# Patient Record
Sex: FEMALE | Race: WHITE | Employment: OTHER | URBAN - METROPOLITAN AREA
[De-identification: names, ages, dates, MRNs, and addresses within clinical notes are randomized per-mention and may not be internally consistent; named-entity substitution may affect disease eponyms.]

---

## 2023-02-06 ENCOUNTER — APPOINTMENT (OUTPATIENT)
Dept: GENERAL RADIOLOGY | Age: 73
DRG: 689 | End: 2023-02-06
Payer: MEDICARE

## 2023-02-06 ENCOUNTER — HOSPITAL ENCOUNTER (INPATIENT)
Age: 73
LOS: 1 days | Discharge: HOME | DRG: 689 | End: 2023-02-09
Attending: EMERGENCY MEDICINE | Admitting: INTERNAL MEDICINE
Payer: MEDICARE

## 2023-02-06 ENCOUNTER — APPOINTMENT (OUTPATIENT)
Dept: CT IMAGING | Age: 73
DRG: 689 | End: 2023-02-06
Payer: MEDICARE

## 2023-02-06 DIAGNOSIS — R62.7 FAILURE TO THRIVE IN ADULT: ICD-10-CM

## 2023-02-06 DIAGNOSIS — R53.1 GENERALIZED WEAKNESS: Primary | ICD-10-CM

## 2023-02-06 DIAGNOSIS — N39.0 ACUTE UTI: ICD-10-CM

## 2023-02-06 LAB
ALBUMIN SERPL-MCNC: 2.8 G/DL (ref 3.2–4.6)
ALBUMIN/GLOB SERPL: 0.6 (ref 0.4–1.6)
ALP SERPL-CCNC: 76 U/L (ref 50–136)
ALT SERPL-CCNC: 20 U/L (ref 12–65)
ANION GAP SERPL CALC-SCNC: 8 MMOL/L (ref 2–11)
APPEARANCE UR: ABNORMAL
AST SERPL-CCNC: 17 U/L (ref 15–37)
BACTERIA URNS QL MICRO: ABNORMAL /HPF
BASOPHILS # BLD: 0.1 K/UL (ref 0–0.2)
BASOPHILS NFR BLD: 1 % (ref 0–2)
BILIRUB SERPL-MCNC: 0.7 MG/DL (ref 0.2–1.1)
BILIRUB UR QL: NEGATIVE
BUN SERPL-MCNC: 13 MG/DL (ref 8–23)
CALCIUM SERPL-MCNC: 8.8 MG/DL (ref 8.3–10.4)
CHLORIDE SERPL-SCNC: 106 MMOL/L (ref 101–110)
CO2 SERPL-SCNC: 21 MMOL/L (ref 21–32)
COLOR UR: ABNORMAL
CREAT SERPL-MCNC: 0.8 MG/DL (ref 0.6–1)
DIFFERENTIAL METHOD BLD: ABNORMAL
EKG ATRIAL RATE: 38 BPM
EKG ATRIAL RATE: 96 BPM
EKG DIAGNOSIS: NORMAL
EKG DIAGNOSIS: NORMAL
EKG P AXIS: 58 DEGREES
EKG P AXIS: 58 DEGREES
EKG P-R INTERVAL: 132 MS
EKG P-R INTERVAL: 136 MS
EKG Q-T INTERVAL: 394 MS
EKG Q-T INTERVAL: 414 MS
EKG QRS DURATION: 132 MS
EKG QRS DURATION: 136 MS
EKG QTC CALCULATION (BAZETT): 393 MS
EKG QTC CALCULATION (BAZETT): 497 MS
EKG R AXIS: 4 DEGREES
EKG R AXIS: 4 DEGREES
EKG T AXIS: -28 DEGREES
EKG T AXIS: -55 DEGREES
EKG VENTRICULAR RATE: 87 BPM
EKG VENTRICULAR RATE: 96 BPM
EOSINOPHIL # BLD: 0.7 K/UL (ref 0–0.8)
EOSINOPHIL NFR BLD: 6 % (ref 0.5–7.8)
EPI CELLS #/AREA URNS HPF: ABNORMAL /HPF
ERYTHROCYTE [DISTWIDTH] IN BLOOD BY AUTOMATED COUNT: 14.7 % (ref 11.9–14.6)
GLOBULIN SER CALC-MCNC: 4.4 G/DL (ref 2.8–4.5)
GLUCOSE BLD STRIP.AUTO-MCNC: 118 MG/DL (ref 65–100)
GLUCOSE BLD STRIP.AUTO-MCNC: 141 MG/DL (ref 65–100)
GLUCOSE SERPL-MCNC: 98 MG/DL (ref 65–100)
GLUCOSE UR STRIP.AUTO-MCNC: NEGATIVE MG/DL
HCT VFR BLD AUTO: 47.8 % (ref 35.8–46.3)
HGB BLD-MCNC: 14.8 G/DL (ref 11.7–15.4)
HGB UR QL STRIP: ABNORMAL
IMM GRANULOCYTES # BLD AUTO: 0.2 K/UL (ref 0–0.5)
IMM GRANULOCYTES NFR BLD AUTO: 1 % (ref 0–5)
KETONES UR QL STRIP.AUTO: NEGATIVE MG/DL
LACTATE SERPL-SCNC: 1.9 MMOL/L (ref 0.4–2)
LACTATE SERPL-SCNC: 2.6 MMOL/L (ref 0.4–2)
LEUKOCYTE ESTERASE UR QL STRIP.AUTO: ABNORMAL
LYMPHOCYTES # BLD: 1.8 K/UL (ref 0.5–4.6)
LYMPHOCYTES NFR BLD: 15 % (ref 13–44)
MAGNESIUM SERPL-MCNC: 2 MG/DL (ref 1.8–2.4)
MCH RBC QN AUTO: 28.7 PG (ref 26.1–32.9)
MCHC RBC AUTO-ENTMCNC: 31 G/DL (ref 31.4–35)
MCV RBC AUTO: 92.8 FL (ref 82–102)
MONOCYTES # BLD: 1.1 K/UL (ref 0.1–1.3)
MONOCYTES NFR BLD: 9 % (ref 4–12)
NEUTS SEG # BLD: 8.3 K/UL (ref 1.7–8.2)
NEUTS SEG NFR BLD: 68 % (ref 43–78)
NITRITE UR QL STRIP.AUTO: POSITIVE
NRBC # BLD: 0 K/UL (ref 0–0.2)
OTHER OBSERVATIONS: ABNORMAL
PH UR STRIP: 5.5 (ref 5–9)
PLATELET # BLD AUTO: 283 K/UL (ref 150–450)
PMV BLD AUTO: 9.7 FL (ref 9.4–12.3)
POTASSIUM SERPL-SCNC: 4.4 MMOL/L (ref 3.5–5.1)
PROCALCITONIN SERPL-MCNC: <0.05 NG/ML (ref 0–0.49)
PROT SERPL-MCNC: 7.2 G/DL (ref 6.3–8.2)
PROT UR STRIP-MCNC: ABNORMAL MG/DL
RBC # BLD AUTO: 5.15 M/UL (ref 4.05–5.2)
SERVICE CMNT-IMP: ABNORMAL
SERVICE CMNT-IMP: ABNORMAL
SODIUM SERPL-SCNC: 135 MMOL/L (ref 133–143)
SP GR UR REFRACTOMETRY: 1.01 (ref 1–1.02)
UROBILINOGEN UR QL STRIP.AUTO: 1 EU/DL (ref 0.2–1)
WBC # BLD AUTO: 12.1 K/UL (ref 4.3–11.1)
WBC URNS QL MICRO: ABNORMAL /HPF

## 2023-02-06 PROCEDURE — G0378 HOSPITAL OBSERVATION PER HR: HCPCS

## 2023-02-06 PROCEDURE — 87086 URINE CULTURE/COLONY COUNT: CPT

## 2023-02-06 PROCEDURE — 99285 EMERGENCY DEPT VISIT HI MDM: CPT

## 2023-02-06 PROCEDURE — 36415 COLL VENOUS BLD VENIPUNCTURE: CPT

## 2023-02-06 PROCEDURE — 70450 CT HEAD/BRAIN W/O DYE: CPT

## 2023-02-06 PROCEDURE — 6370000000 HC RX 637 (ALT 250 FOR IP): Performed by: INTERNAL MEDICINE

## 2023-02-06 PROCEDURE — 80053 COMPREHEN METABOLIC PANEL: CPT

## 2023-02-06 PROCEDURE — 96366 THER/PROPH/DIAG IV INF ADDON: CPT

## 2023-02-06 PROCEDURE — 85025 COMPLETE CBC W/AUTO DIFF WBC: CPT

## 2023-02-06 PROCEDURE — 96372 THER/PROPH/DIAG INJ SC/IM: CPT

## 2023-02-06 PROCEDURE — 83735 ASSAY OF MAGNESIUM: CPT

## 2023-02-06 PROCEDURE — 84145 PROCALCITONIN (PCT): CPT

## 2023-02-06 PROCEDURE — 82962 GLUCOSE BLOOD TEST: CPT

## 2023-02-06 PROCEDURE — 83605 ASSAY OF LACTIC ACID: CPT

## 2023-02-06 PROCEDURE — 81001 URINALYSIS AUTO W/SCOPE: CPT

## 2023-02-06 PROCEDURE — 6360000002 HC RX W HCPCS: Performed by: EMERGENCY MEDICINE

## 2023-02-06 PROCEDURE — 71045 X-RAY EXAM CHEST 1 VIEW: CPT

## 2023-02-06 PROCEDURE — 87040 BLOOD CULTURE FOR BACTERIA: CPT

## 2023-02-06 PROCEDURE — 96375 TX/PRO/DX INJ NEW DRUG ADDON: CPT

## 2023-02-06 PROCEDURE — 2700000000 HC OXYGEN THERAPY PER DAY

## 2023-02-06 PROCEDURE — 87088 URINE BACTERIA CULTURE: CPT

## 2023-02-06 PROCEDURE — 2580000003 HC RX 258: Performed by: EMERGENCY MEDICINE

## 2023-02-06 PROCEDURE — 93005 ELECTROCARDIOGRAM TRACING: CPT | Performed by: STUDENT IN AN ORGANIZED HEALTH CARE EDUCATION/TRAINING PROGRAM

## 2023-02-06 PROCEDURE — 94760 N-INVAS EAR/PLS OXIMETRY 1: CPT

## 2023-02-06 PROCEDURE — 2580000003 HC RX 258: Performed by: INTERNAL MEDICINE

## 2023-02-06 PROCEDURE — 96365 THER/PROPH/DIAG IV INF INIT: CPT

## 2023-02-06 PROCEDURE — 6360000002 HC RX W HCPCS: Performed by: INTERNAL MEDICINE

## 2023-02-06 PROCEDURE — 87186 SC STD MICRODIL/AGAR DIL: CPT

## 2023-02-06 PROCEDURE — 2500000003 HC RX 250 WO HCPCS: Performed by: INTERNAL MEDICINE

## 2023-02-06 PROCEDURE — 93005 ELECTROCARDIOGRAM TRACING: CPT | Performed by: EMERGENCY MEDICINE

## 2023-02-06 RX ORDER — NICOTINE 21 MG/24HR
1 PATCH, TRANSDERMAL 24 HOURS TRANSDERMAL DAILY
Status: DISCONTINUED | OUTPATIENT
Start: 2023-02-06 | End: 2023-02-09 | Stop reason: HOSPADM

## 2023-02-06 RX ORDER — ACETAMINOPHEN 650 MG/1
650 SUPPOSITORY RECTAL EVERY 6 HOURS PRN
Status: DISCONTINUED | OUTPATIENT
Start: 2023-02-06 | End: 2023-02-09 | Stop reason: HOSPADM

## 2023-02-06 RX ORDER — FAMOTIDINE 20 MG/1
10 TABLET, FILM COATED ORAL DAILY PRN
Status: DISCONTINUED | OUTPATIENT
Start: 2023-02-06 | End: 2023-02-09 | Stop reason: HOSPADM

## 2023-02-06 RX ORDER — ONDANSETRON 4 MG/1
4 TABLET, ORALLY DISINTEGRATING ORAL EVERY 8 HOURS PRN
Status: DISCONTINUED | OUTPATIENT
Start: 2023-02-06 | End: 2023-02-09 | Stop reason: HOSPADM

## 2023-02-06 RX ORDER — ONDANSETRON 2 MG/ML
4 INJECTION INTRAMUSCULAR; INTRAVENOUS EVERY 6 HOURS PRN
Status: DISCONTINUED | OUTPATIENT
Start: 2023-02-06 | End: 2023-02-09 | Stop reason: HOSPADM

## 2023-02-06 RX ORDER — MAGNESIUM HYDROXIDE/ALUMINUM HYDROXICE/SIMETHICONE 120; 1200; 1200 MG/30ML; MG/30ML; MG/30ML
30 SUSPENSION ORAL EVERY 6 HOURS PRN
Status: DISCONTINUED | OUTPATIENT
Start: 2023-02-06 | End: 2023-02-09 | Stop reason: HOSPADM

## 2023-02-06 RX ORDER — ASPIRIN 81 MG/1
81 TABLET ORAL DAILY
Status: DISCONTINUED | OUTPATIENT
Start: 2023-02-07 | End: 2023-02-09 | Stop reason: HOSPADM

## 2023-02-06 RX ORDER — ENOXAPARIN SODIUM 100 MG/ML
40 INJECTION SUBCUTANEOUS EVERY 24 HOURS
Status: DISCONTINUED | OUTPATIENT
Start: 2023-02-06 | End: 2023-02-09 | Stop reason: HOSPADM

## 2023-02-06 RX ORDER — MAGNESIUM SULFATE IN WATER 40 MG/ML
2000 INJECTION, SOLUTION INTRAVENOUS PRN
Status: DISCONTINUED | OUTPATIENT
Start: 2023-02-06 | End: 2023-02-09 | Stop reason: HOSPADM

## 2023-02-06 RX ORDER — 0.9 % SODIUM CHLORIDE 0.9 %
1000 INTRAVENOUS SOLUTION INTRAVENOUS ONCE
Status: COMPLETED | OUTPATIENT
Start: 2023-02-06 | End: 2023-02-06

## 2023-02-06 RX ORDER — SODIUM CHLORIDE 0.9 % (FLUSH) 0.9 %
5-40 SYRINGE (ML) INJECTION EVERY 12 HOURS SCHEDULED
Status: DISCONTINUED | OUTPATIENT
Start: 2023-02-06 | End: 2023-02-09 | Stop reason: HOSPADM

## 2023-02-06 RX ORDER — INSULIN LISPRO 100 [IU]/ML
0-4 INJECTION, SOLUTION INTRAVENOUS; SUBCUTANEOUS NIGHTLY
Status: DISCONTINUED | OUTPATIENT
Start: 2023-02-06 | End: 2023-02-09 | Stop reason: HOSPADM

## 2023-02-06 RX ORDER — POTASSIUM CHLORIDE 7.45 MG/ML
10 INJECTION INTRAVENOUS PRN
Status: DISCONTINUED | OUTPATIENT
Start: 2023-02-06 | End: 2023-02-09 | Stop reason: HOSPADM

## 2023-02-06 RX ORDER — ATORVASTATIN CALCIUM 80 MG/1
80 TABLET, FILM COATED ORAL NIGHTLY
Status: DISCONTINUED | OUTPATIENT
Start: 2023-02-06 | End: 2023-02-09 | Stop reason: HOSPADM

## 2023-02-06 RX ORDER — DULOXETIN HYDROCHLORIDE 30 MG/1
60 CAPSULE, DELAYED RELEASE ORAL DAILY
Status: DISCONTINUED | OUTPATIENT
Start: 2023-02-06 | End: 2023-02-09 | Stop reason: HOSPADM

## 2023-02-06 RX ORDER — TRAZODONE HYDROCHLORIDE 50 MG/1
100 TABLET ORAL NIGHTLY
Status: DISCONTINUED | OUTPATIENT
Start: 2023-02-06 | End: 2023-02-09 | Stop reason: HOSPADM

## 2023-02-06 RX ORDER — VITAMIN B COMPLEX
1000 TABLET ORAL DAILY
Status: DISCONTINUED | OUTPATIENT
Start: 2023-02-06 | End: 2023-02-09 | Stop reason: HOSPADM

## 2023-02-06 RX ORDER — ACETAMINOPHEN 325 MG/1
650 TABLET ORAL EVERY 6 HOURS PRN
Status: DISCONTINUED | OUTPATIENT
Start: 2023-02-06 | End: 2023-02-09 | Stop reason: HOSPADM

## 2023-02-06 RX ORDER — CLOPIDOGREL BISULFATE 75 MG/1
75 TABLET ORAL DAILY
Status: DISCONTINUED | OUTPATIENT
Start: 2023-02-06 | End: 2023-02-09 | Stop reason: HOSPADM

## 2023-02-06 RX ORDER — INSULIN LISPRO 100 [IU]/ML
0-8 INJECTION, SOLUTION INTRAVENOUS; SUBCUTANEOUS
Status: DISCONTINUED | OUTPATIENT
Start: 2023-02-06 | End: 2023-02-09 | Stop reason: HOSPADM

## 2023-02-06 RX ORDER — SODIUM CHLORIDE 0.9 % (FLUSH) 0.9 %
5-40 SYRINGE (ML) INJECTION PRN
Status: DISCONTINUED | OUTPATIENT
Start: 2023-02-06 | End: 2023-02-09 | Stop reason: HOSPADM

## 2023-02-06 RX ORDER — LISINOPRIL 5 MG/1
5 TABLET ORAL DAILY
Status: DISCONTINUED | OUTPATIENT
Start: 2023-02-06 | End: 2023-02-09 | Stop reason: HOSPADM

## 2023-02-06 RX ORDER — SODIUM CHLORIDE 9 MG/ML
INJECTION, SOLUTION INTRAVENOUS PRN
Status: DISCONTINUED | OUTPATIENT
Start: 2023-02-06 | End: 2023-02-09 | Stop reason: HOSPADM

## 2023-02-06 RX ORDER — BISACODYL 10 MG
10 SUPPOSITORY, RECTAL RECTAL DAILY PRN
Status: DISCONTINUED | OUTPATIENT
Start: 2023-02-06 | End: 2023-02-09 | Stop reason: HOSPADM

## 2023-02-06 RX ORDER — SODIUM CHLORIDE 9 MG/ML
INJECTION, SOLUTION INTRAVENOUS CONTINUOUS
Status: DISCONTINUED | OUTPATIENT
Start: 2023-02-06 | End: 2023-02-07

## 2023-02-06 RX ORDER — DEXTROSE MONOHYDRATE 100 MG/ML
INJECTION, SOLUTION INTRAVENOUS CONTINUOUS PRN
Status: DISCONTINUED | OUTPATIENT
Start: 2023-02-06 | End: 2023-02-09 | Stop reason: HOSPADM

## 2023-02-06 RX ORDER — POLYETHYLENE GLYCOL 3350 17 G/17G
17 POWDER, FOR SOLUTION ORAL DAILY PRN
Status: DISCONTINUED | OUTPATIENT
Start: 2023-02-06 | End: 2023-02-09 | Stop reason: HOSPADM

## 2023-02-06 RX ORDER — POTASSIUM CHLORIDE 20 MEQ/1
40 TABLET, EXTENDED RELEASE ORAL PRN
Status: DISCONTINUED | OUTPATIENT
Start: 2023-02-06 | End: 2023-02-09 | Stop reason: HOSPADM

## 2023-02-06 RX ADMIN — CLOPIDOGREL BISULFATE 75 MG: 75 TABLET ORAL at 15:20

## 2023-02-06 RX ADMIN — TUBERCULIN PURIFIED PROTEIN DERIVATIVE 5 UNITS: 5 INJECTION, SOLUTION INTRADERMAL at 15:44

## 2023-02-06 RX ADMIN — LISINOPRIL 5 MG: 5 TABLET ORAL at 15:46

## 2023-02-06 RX ADMIN — VITAMIN D, TAB 1000IU (100/BT) 1000 UNITS: 25 TAB at 15:46

## 2023-02-06 RX ADMIN — SODIUM CHLORIDE: 9 INJECTION, SOLUTION INTRAVENOUS at 15:14

## 2023-02-06 RX ADMIN — ATORVASTATIN CALCIUM 80 MG: 40 TABLET, FILM COATED ORAL at 21:25

## 2023-02-06 RX ADMIN — TRAZODONE HYDROCHLORIDE 100 MG: 50 TABLET ORAL at 21:25

## 2023-02-06 RX ADMIN — SODIUM CHLORIDE 1000 ML: 9 INJECTION, SOLUTION INTRAVENOUS at 11:49

## 2023-02-06 RX ADMIN — DULOXETINE HYDROCHLORIDE 60 MG: 30 CAPSULE, DELAYED RELEASE ORAL at 15:46

## 2023-02-06 RX ADMIN — ONDANSETRON 4 MG: 2 INJECTION INTRAMUSCULAR; INTRAVENOUS at 18:41

## 2023-02-06 RX ADMIN — ENOXAPARIN SODIUM 40 MG: 100 INJECTION SUBCUTANEOUS at 15:20

## 2023-02-06 RX ADMIN — METOPROLOL TARTRATE 25 MG: 25 TABLET, FILM COATED ORAL at 21:32

## 2023-02-06 RX ADMIN — CEFTRIAXONE 1000 MG: 1 INJECTION, POWDER, FOR SOLUTION INTRAMUSCULAR; INTRAVENOUS at 11:53

## 2023-02-06 ASSESSMENT — ENCOUNTER SYMPTOMS
RHINORRHEA: 0
VOMITING: 0
COUGH: 0
NAUSEA: 0
ABDOMINAL PAIN: 0
BLOOD IN STOOL: 0
SHORTNESS OF BREATH: 0
CONSTIPATION: 0

## 2023-02-06 ASSESSMENT — PAIN SCALES - GENERAL
PAINLEVEL_OUTOF10: 0
PAINLEVEL_OUTOF10: 0

## 2023-02-06 NOTE — H&P
Hospitalist History and Physical   Admit Date:  2023  9:20 AM   Name:  Violeta Lawrence   Age:  81 y.o. Sex:  female  :  1950   MRN:  [id number]   Room:  ER/    Presenting Complaint: Incontinence and Fatigue     Reason(s) for Admission: Failure to thrive in adult [R62.7]     History of Present Illness:     28-year-old female with history of osteoporosis, hypertension, chronic hepatitis C, anxiety, depression, CAD, type 2 diabetes mellitus, CVA with residual right-sided weakness who presents via EMS with complaint of generalized weakness, fatigue. Vital signs stable. Patient with residual right-sided deficits. Patient covered in fecal matter. Son lives with her but he works outside the home. There is a friend who lives with them named Efrain Rodney and he is in the home 24 hours daily but she reports he cannot provide any assistance for her. She has had progressive weakness and reportedly became concerned because she could no longer get up and bear weight on her knees. She has brought in covered in feces and apparently bedbound unknown period of time. Discussed goals of therapy to include treat UTI as discovered. IV hydration supportive care physical therapy eval and possible recommendations for strengthening and nutritional support. She became very upset and screamed I am not going to any nursing home and claims she would refuse any recommended placement. We discussed initial goal of IV hydration treat UTI and evaluation for proper intervention for patient's safety. She reports her son will be available after 3 PM after he gets off work but is not able to care for her 24 hours daily. She has chronic right-sided weakness regarding old CVA. Oral intake appears poor. She continues to smoke cigarettes has known COPD and has mild wheeze at time of admission. She is afebrile no SIRS criteria with exception of white blood count 12,100.   Blood and urine cultures have been ordered and she has gram Rocephin IV. She will be admitted to observation for failure to thrive and treatment as outlined with IV hydration await cultures and empiric ceftriaxone. Assessment & Plan:     Principal Problem:    Failure to thrive in adult  Plan: PT OT eval, IV hydration, PPD. Dietitian evaluation and nutritional support. Active Problems:    UTI (urinary tract infection)  Plan: Send urine culture, blood cultures, IV fluids and empiric Rocephin    Essential hypertension  Plan: Continue home meds-need to clarify dose. Chronic hepatitis C without hepatic coma (HCC)  Plan: Noted    Anxiety and depression  Plan: Continue home medications. Denies depressive thoughts or suicidal ideations    Coronary artery disease involving native coronary artery of native heart without angina pectoris  Plan: Denies anginal equivalent. No diaphoresis. Continue home meds. Type 2 diabetes mellitus without complication, without long-term current use of insulin (HCC)  Plan: Hold metformin-sliding scale insulin prandial coverage as needed    Panlobular emphysema (HCC)  Plan: Nicotine patch as needed for nicotine addiction and albuterol as needed wheezing      Anticipated discharge needs:   Pending clinical course    Diet: ADULT DIET; Regular; 3 carb choices (45 gm/meal); Low Fat/Low Chol/High Fiber/MIKE  VTE ppx: Lovenox  Code status: Full Code    Hospital Problems:  Principal Problem:    Failure to thrive in adult  Active Problems:    UTI (urinary tract infection)    Essential hypertension    Chronic hepatitis C without hepatic coma (HCC)    Anxiety and depression    Coronary artery disease involving native coronary artery of native heart without angina pectoris    Type 2 diabetes mellitus without complication, without long-term current use of insulin (HCC)    Panlobular emphysema (HCC)  Resolved Problems:    * No resolved hospital problems.  *       Past History:     Past Medical History:   Diagnosis Date    Anxiety and depression 2012    Anxiety and depression     Arteriopathy ([facility] 2015    Last Assessment & Plan:  I DO NOT THINK A REPEAT YESICA (TRANSESOPHAGEAL ECHOCARDIOGRAM) IS INDICATED. SHE HAD SMALL (<5mm) ATHEROMA OF HER AORTA. THIS IS NOT A CLOT/THROMBUS AND IT IS NOT EXPECTED TO RESOLVE AFTER INITIATION OF COUMADIN. WILL DEFER DECISION REGARDING COUMADIN DURATION TO NEUROLOGIST. PATIENT ADAMANT ABOUT STOPPING COUMADIN.   NEEDS TO BE ON SOME ANTIPLATELETS (ASA VS ASA+PLAVIX)     CAD (coronary artery disease) 2012    Chronic hepatitis C ([facility] 2012    Chronic pain 2012    COPD (chronic obstructive pulmonary disease) ([facility] 2012    DM2 (diabetes mellitus, type 2) ([facility] 2012    DM2 (diabetes mellitus, type 2) ([facility] 2012    HLD (hyperlipidemia) 2012    HTN (hypertension) 2012    Ischemic embolic stroke ([facility]     Peripheral neuropathy 10/11/2013    Peripheral neuropathy 10/11/2013    Post-polio syndrome 2012    Post-polio syndrome 2012    Right fascicular block 2015    Spinal stenosis of cervical region 3/23/2012       Past Surgical History:   Procedure Laterality Date    CARDIAC CATHETERIZATION      CARPAL TUNNEL RELEASE       SECTION      ORTHOPEDIC SURGERY      bilateral knees    ORTHOPEDIC SURGERY      wrist        Social History     Tobacco Use    Smoking status: Every Day     Packs/day: 1.00     Types: Cigarettes    Smokeless tobacco: Never   Substance Use Topics    Alcohol use: No     Alcohol/week: 0.0 standard drinks      Social History     Substance and Sexual Activity   Drug Use No       Family History   Problem Relation Age of Onset    Coronary Art Dis Maternal Grandfather     Coronary Art Dis Paternal Grandfather     Heart Disease Mother     Heart Disease Father     Cancer Mother     Diabetes Mother         Immunization History   Administered Date(s) Administered    Influenza Trivalent 2013, 2014    Influenza Virus Vaccine 10/08/2008 Pneumococcal Polysaccharide ([id number]) 01/02/2012    Pneumococcal Vaccine 10/08/2008    Tdap (Boostrix, Adacel) 07/02/2019     Allergies   Allergen Reactions    Promethazine Itching     Prior to Admit Medications:  Current Outpatient Medications   Medication Instructions    aspirin 81 MG EC tablet Oral, DAILY    atorvastatin (LIPITOR) 80 mg, Oral, DAILY    clopidogrel (PLAVIX) 75 MG tablet Oral    DULoxetine (CYMBALTA) 60 mg, Oral, DAILY    fluconazole (DIFLUCAN) 150 mg, Oral, DAILY    Lancets MISC One Touch Penlet Plus. Tests twice daily due to fluctuating blood sugar. .00    lisinopril (PRINIVIL;ZESTRIL) 10 MG tablet Oral, DAILY    metFORMIN (GLUCOPHAGE) 500 MG tablet Oral, DAILY WITH BREAKFAST    metoprolol tartrate (LOPRESSOR) 50 MG tablet Oral, 2 TIMES DAILY    Misc. Devices (WALKER) MISC [place]co Walker to be used to help mobility    nystatin (MYCOSTATIN) 217286 UNIT/GM cream Topical, 2 TIMES DAILY    traZODone (DESYREL) 100 mg, Oral    vitamin D (CHOLECALCIFEROL) 25 MCG (1000 UT) TABS tablet Oral, DAILY         Objective:   Patient Vitals for the past 24 hrs:   Temp Pulse Resp BP SpO2   02/06/23 1230 -- 78 24 (!) 145/54 91 %   02/06/23 1215 -- 82 (!) 34 (!) 147/90 91 %   02/06/23 1150 -- 83 25 (!) 186/159 92 %   02/06/23 0930 98.2 °F (36.8 °C) -- 18 123/79 93 %       Oxygen Therapy  SpO2: 91 %  O2 Device: None (Room air)    Estimated body mass index is 22.71 kg/m² as calculated from the following:    Height as of this encounter: 5' 7\" (1.702 m). Weight as of this encounter: 145 lb (65.8 kg). No intake or output data in the 24 hours ending 02/06/23 1311      Physical Exam:    Blood pressure (!) 145/54, pulse 78, temperature 98.2 °F (36.8 °C), temperature source Oral, resp. rate 24, height 5' 7\" (1.702 m), weight 145 lb (65.8 kg), SpO2 91 %. General:    No acute distress-alert and oriented x3  Head:  Normocephalic, atraumatic  Eyes:  Sclerae appear normal.  Pupils equally round.   ENT:  Nares appear normal.  Dentition is poor. Oral mucous membranes dry  Neck:  No restricted ROM. Trachea midline   CV:   RRR. No m/r/g. No jugular venous distension. Lungs:   Bilateral mild expiratory wheeze at rest.  No use of accessory muscles. Able to speak complete sentences. No retraction. Abdomen:   Soft, nontender, nondistended. No palpable masses  Extremities: No cyanosis or clubbing. No unilateral lower extremity edema  Neuro:  CN II-XII grossly intact. No focal motor deficits.       I have personally reviewed labs and tests:  Recent Labs:  Recent Results (from the past 24 hour(s))   EKG 12 Lead    Collection Time: 02/06/23  9:36 AM   Result Value Ref Range    Ventricular Rate 87 BPM    Atrial Rate 38 BPM    P-R Interval 136 ms    QRS Duration 136 ms    Q-T Interval 414 ms    QTc Calculation (Bazett) 393 ms    P Axis 58 degrees    R Axis 4 degrees    T Axis -55 degrees    Diagnosis Sinus rhythm    CMP    Collection Time: 02/06/23 10:00 AM   Result Value Ref Range    Sodium 135 133 - 143 mmol/L    Potassium 4.4 3.5 - 5.1 mmol/L    Chloride 106 101 - 110 mmol/L    CO2 21 21 - 32 mmol/L    Anion Gap 8 2 - 11 mmol/L    Glucose 98 65 - 100 mg/dL    BUN 13 8 - 23 MG/DL    Creatinine 0.80 0.6 - 1.0 MG/DL    Est, Glom Filt Rate >60 >60 ml/min/1.73m2    Calcium 8.8 8.3 - 10.4 MG/DL    Total Bilirubin 0.7 0.2 - 1.1 MG/DL    ALT 20 12 - 65 U/L    AST 17 15 - 37 U/L    Alk Phosphatase 76 50 - 136 U/L    Total Protein 7.2 6.3 - 8.2 g/dL    Albumin 2.8 (L) 3.2 - 4.6 g/dL    Globulin 4.4 2.8 - 4.5 g/dL    Albumin/Globulin Ratio 0.6 0.4 - 1.6     Magnesium    Collection Time: 02/06/23 10:00 AM   Result Value Ref Range    Magnesium 2.0 1.8 - 2.4 mg/dL   Procalcitonin    Collection Time: 02/06/23 10:00 AM   Result Value Ref Range    Procalcitonin <0.05 0.00 - 0.49 ng/mL   Urinalysis w rflx microscopic    Collection Time: 02/06/23 11:06 AM   Result Value Ref Range    Color, UA YELLOW/STRAW      Appearance CLOUDY      Specific Gravity, UA 1.007 1.001 - 1.023      pH, Urine 5.5 5.0 - 9.0      Protein, UA TRACE (A) NEG mg/dL    Glucose, UA Negative mg/dL    Ketones, Urine Negative NEG mg/dL    Bilirubin Urine Negative NEG      Blood, Urine TRACE (A) NEG      Urobilinogen, Urine 1.0 0.2 - 1.0 EU/dL    Nitrite, Urine Positive (A) NEG      Leukocyte Esterase, Urine LARGE (A) NEG      WBC, UA  0 /hpf    Epithelial Cells UA 10-20 0 /hpf    BACTERIA, URINE 4+ (H) 0 /hpf    Other observations RESULTS VERIFIED MANUALLY     CBC with Auto Differential    Collection Time: 02/06/23 11:06 AM   Result Value Ref Range    WBC 12.1 (H) 4.3 - 11.1 K/uL    RBC 5.15 4.05 - 5.2 M/uL    Hemoglobin 14.8 11.7 - 15.4 g/dL    Hematocrit 47.8 (H) 35.8 - 46.3 %    MCV 92.8 82 - 102 FL    MCH 28.7 26.1 - 32.9 PG    MCHC 31.0 (L) 31.4 - 35.0 g/dL    RDW 14.7 (H) 11.9 - 14.6 %    Platelets 898 345 - 679 K/uL    MPV 9.7 9.4 - 12.3 FL    nRBC 0.00 0.0 - 0.2 K/uL    Differential Type AUTOMATED      Seg Neutrophils 68 43 - 78 %    Lymphocytes 15 13 - 44 %    Monocytes 9 4.0 - 12.0 %    Eosinophils % 6 0.5 - 7.8 %    Basophils 1 0.0 - 2.0 %    Immature Granulocytes 1 0.0 - 5.0 %    Segs Absolute 8.3 (H) 1.7 - 8.2 K/UL    Absolute Lymph # 1.8 0.5 - 4.6 K/UL    Absolute Mono # 1.1 0.1 - 1.3 K/UL    Absolute Eos # 0.7 0.0 - 0.8 K/UL    Basophils Absolute 0.1 0.0 - 0.2 K/UL    Absolute Immature Granulocyte 0.2 0.0 - 0.5 K/UL   Blood Culture 1    Collection Time: 02/06/23 11:43 AM    Specimen: Blood   Result Value Ref Range    Special Requests RIGHT FOREARM      Culture PENDING        I have personally reviewed imaging studies:  CT HEAD WO CONTRAST    Result Date: 2/6/2023  Head CT INDICATION: Increasing extremity weakness, history of prior stroke Multiple axial images obtained through the brain without intravenous contrast. Radiation dose reduction techniques were used for this study:   All CT scans performed at this facility use one or all of the following: Automated exposure control, adjustment of the mA and/or kVp according to patient's size, iterative reconstruction. COMPARISON: None FINDINGS: There is left parietal encephalomalacia consistent with old left MCA territory infarct. There are also chronic changes in the white matter. There is no CT evidence of acute hemorrhage or infarction. The ventricles are normal in size. There are no extra-axial fluid collections. No masses are seen. The sinuses are clear. There are no bony lesions. Old left-sided infarct. No CT evidence of acute intracranial abnormality. XR CHEST PORTABLE    Result Date: 2/6/2023  Chest X-ray INDICATION: Increasing weakness AP/PA view of the chest was obtained. FINDINGS: The lungs are clear. There are no infiltrates or effusions. There is mild cardiomegaly. The bony thorax is intact. No acute findings in the chest       Echocardiogram:  No results found for this or any previous visit. Orders Placed This Encounter   Medications    cefTRIAXone (ROCEPHIN) 1,000 mg in sodium chloride 0.9 % 50 mL IVPB (mini-bag)     Order Specific Question:   Antimicrobial Indications     Answer:   Urinary Tract Infection     Order Specific Question:   Antimicrobial Indications     Answer:    Other     Order Specific Question:   Other Abx Indication     Answer:   Sepsis    0.9 % sodium chloride bolus    sodium chloride flush 0.9 % injection 5-40 mL    sodium chloride flush 0.9 % injection 5-40 mL    0.9 % sodium chloride infusion    OR Linked Order Group     potassium chloride (KLOR-CON M) extended release tablet 40 mEq     potassium bicarb-citric acid (EFFER-K) effervescent tablet 40 mEq     potassium chloride 10 mEq/100 mL IVPB (Peripheral Line)    potassium chloride 10 mEq/100 mL IVPB (Peripheral Line)    magnesium sulfate 2000 mg in 50 mL IVPB premix    OR Linked Order Group     ondansetron (ZOFRAN-ODT) disintegrating tablet 4 mg     ondansetron (ZOFRAN) injection 4 mg    polyethylene glycol (GLYCOLAX) packet 17 g    bisacodyl (DULCOLAX) suppository 10 mg    famotidine (PEPCID) tablet 10 mg    aluminum & magnesium hydroxide-simethicone (MAALOX) 200-200-20 MG/5ML suspension 30 mL    OR Linked Order Group     acetaminophen (TYLENOL) tablet 650 mg     acetaminophen (TYLENOL) suppository 650 mg    0.9 % sodium chloride infusion    enoxaparin (LOVENOX) injection 40 mg     Hold if platelets < 22B, or Hb < 7     Order Specific Question:   Indication of Use     Answer:   Prophylaxis-DVT/PE    aspirin EC tablet 81 mg    atorvastatin (LIPITOR) tablet 80 mg    clopidogrel (PLAVIX) tablet 75 mg    DULoxetine (CYMBALTA) extended release capsule 60 mg    lisinopril (PRINIVIL;ZESTRIL) tablet 5 mg    metoprolol tartrate (LOPRESSOR) tablet 25 mg    traZODone (DESYREL) tablet 100 mg    vitamin D (CHOLECALCIFEROL) tablet 1,000 Units    cefTRIAXone (ROCEPHIN) 1,000 mg in sodium chloride 0.9 % 50 mL IVPB (mini-bag)     Order Specific Question:   Antimicrobial Indications     Answer:   Urinary Tract Infection     Order Specific Question:   UTI duration of therapy     Answer:   7 days    nicotine (NICODERM CQ) 14 MG/24HR 1 patch    albuterol (PROVENTIL) nebulizer solution 2.5 mg     Order Specific Question:   Initiate RT Bronchodilator Protocol     Answer:   Yes - Inpatient Protocol    tuberculin injection 5 Units    insulin lispro (HUMALOG) injection vial 0-8 Units    insulin lispro (HUMALOG) injection vial 0-4 Units         Signed:  John Oleary DO    Part of this note may have been written by using a voice dictation software. The note has been proof read but may still contain some grammatical/other typographical errors.

## 2023-02-06 NOTE — PROGRESS NOTES
Pt resting in bed comfortably at this time, alert and oriented times 4. No distress noted, respirations even and unlabored on room air. Pt denies pain at this time. Right side is weak; pt had stroke in the past. Pt instructed to call for assistance if needed, call light in place, will continue to monitor. Skin assessment complete with Eliazar Zavala RN. Pt has no skin breakdown noted. Pt sacrum slightly red. Allevyn and zinc paste applied. Pt has flaky skin on bilateral feet. Will continue to monitor.

## 2023-02-06 NOTE — PROGRESS NOTES
Reviewed notes for new spiritual concerns      Will continue to assess how we can best serve this family        [place].       Per notes:       [employer]        LOCAL    CONTACT -  FLAKITO    FULL CODE    OBS PT

## 2023-02-06 NOTE — CARE COORDINATION
02/06/23 1607   Service Assessment   Patient Orientation Alert and [facility]   PCP Verified by CM Yes   Prior Functional Level Assistance with the following:   Current Functional Level Assistance with the following:   Can patient return to prior living arrangement Unknown at present   Ability to make needs known: Good   Family able to assist with home care needs: Yes   Financial Resources Medicare   Social/Functional History   Lives With Son;Other (comment)   Type of [address] [place] [address] Help From Family   ADL Assistance Needs assistance   [address] Needs assistance   Homemaking Responsibilities No   Ambulation Assistance Needs assistance   Transfer Assistance Needs assistance   Active  No   Patient lives with her son and a \"homeless man,\" that she took in. She tells CM that her son works and the other man can't help her. She had a nurse or aide coming in at one time to assist but it's been several months. CM asked patient if it was a [facility] aide but patient doesn't know if she has Medicaid. Patient made it clear that she will never go to a rehab as she has been in the past and had a terrible experience. CM will follow up with patient's son and cousin.

## 2023-02-06 NOTE — ED TRIAGE NOTES
Pt arrives via EMS from home c/o unable to pivot into wheelchair. No strength in legs. Hx of stroke right sided def. Pt arrives in stool. Pt is incontinent at baseline. Nonambulatory at baseline. 162/98. HR 72. 100% RA. 98.2 oral.     Hx of hypertension. Has not had am meds.

## 2023-02-06 NOTE — PROGRESS NOTES
Pt resting in bed comfortably at this time, alert and oriented times 4. No distress noted, respirations even and unlabored on room air. Coarse lung sounds noted in the right lung. Pt denies pain at this time. Pt instructed to call for assistance if needed, call light in place, will continue to monitor.

## 2023-02-06 NOTE — PROGRESS NOTES
TRANSFER - IN REPORT:    Verbal report received from [facility] on Matias Oppenheim  being received from ER for routine progression of patient care      Report consisted of patient's Situation, Background, Assessment and   Recommendations(SBAR). Information from the following report(s) ED SBAR was reviewed with the receiving nurse. Opportunity for questions and clarification was provided. Assessment completed upon patient's arrival to unit and care assumed.

## 2023-02-06 NOTE — ED PROVIDER NOTES
Emergency Department Provider Note                   PCP:                Polo Macdonald MD               Age: 81 y.o. Sex: female       ICD-10-CM    1. Generalized weakness  R53.1       2. Acute UTI  N39.0       3. Failure to thrive in adult  R62.7           DISPOSITION Decision To Admit 02/06/2023 11:36:24 AM        Medical Decision Making  62-year-old female with history of osteoporosis, hypertension, chronic hepatitis C, anxiety, depression, CAD, type 2 diabetes mellitus, CVA with residual right-sided weakness who presents via EMS with complaint of generalized weakness, fatigue. Vital signs stable. Patient with residual right-sided deficits. Patient covered in fecal matter. White blood cell count 12.1. Creatinine stable. CT head with old left-sided infarct. No evidence of acute intracranial abnormality. CT head reviewed myself. In agreement with radiologist's interpretation. Chest x-ray with no acute findings present. No infiltrate or consolidation. Chest x-ray reviewed by myself. EKG with bigeminy. Heart rate 87. No ST elevation noted. Patient with evidence of UTI. Blood cultures, urine culture ordered. 1L NS IVF bolus & Rocephin 1 g IV ordered. Hospitalist consulted for admission. Problems Addressed:  Acute UTI: acute illness or injury  Generalized weakness: acute illness or injury    Amount and/or Complexity of Data Reviewed  Independent Historian: EMS  Labs: ordered. Decision-making details documented in ED Course. Radiology: ordered. ECG/medicine tests: ordered and independent interpretation performed. Risk  Prescription drug management. Decision regarding hospitalization. Complexity of Problem: 2 or more stable chronic illnesses. (4)  Complexity of Problem: 1 acute complicated illness or injury. (4)  The patients assessment required an independent historian: I spoke with the paramedic. I have conducted an independent ordering and review of Labs.   I have conducted an independent ordering and review of EKG. I have conducted an independent ordering and review of X-rays. I have conducted an independent ordering and review of CT Scan. I discussed disposition with another provider. Patient was admitted I have communication with admitting physician. ED Course as of 02/06/23 1222   Mon Feb 06, 2023   1033 Portable Chest X-ray FINDINGS: The lungs are clear. There are no infiltrates or effusions. There is  mild cardiomegaly. The bony thorax is intact. IMPRESSION:  No acute findings in the chest    [DF]   1105 CT head FINDINGS: There is left parietal encephalomalacia consistent with old left MCA  territory infarct. There are also chronic changes in the white matter. There is  no CT evidence of acute hemorrhage or infarction. The ventricles are normal in  size. There are no extra-axial fluid collections. No masses are seen. The  sinuses are clear. There are no bony lesions. IMPRESSION:  Old left-sided infarct. No CT evidence of acute intracranial abnormality. [DF]   1135 Nitrite, Urine(!): Positive [DF]   1135 Leukocyte Esterase, Urine(!): LARGE [DF]   1213 Bacteria, UA(!): 4+ [DF]   1213 WBC, UA:  [DF]      ED Course User Index  [DF] Erik Arteaga MD        Orders Placed This Encounter   Procedures    Blood Culture 1    Blood Culture 2    Culture, Urine    XR CHEST PORTABLE    CT HEAD WO CONTRAST    CMP    Urinalysis w rflx microscopic    Magnesium    CBC with Auto Differential    Lactate, Sepsis    Procalcitonin    EKG 12 Lead    Saline lock IV        Medications   cefTRIAXone (ROCEPHIN) 1,000 mg in sodium chloride 0.9 % 50 mL IVPB (mini-bag) (1,000 mg IntraVENous New Bag 2/6/23 1153)   0.9 % sodium chloride bolus (1,000 mLs IntraVENous New Bag 2/6/23 1149)       New Prescriptions    No medications on file        Fly Sheehan is a 81 y.o. female who presents to the Emergency Department with chief complaint of fatigue.     Chief Complaint Patient presents with    Incontinence    Fatigue      81-year-old female with history of osteoporosis, hypertension, chronic hepatitis C, anxiety, depression, CAD, type 2 diabetes mellitus, CVA with residual right-sided weakness who presents via EMS with complaint of generalized weakness, fatigue. Patient reports decreased strength in bilateral lower extremities. Denies back pain, numbness, tingling, focal weakness. Patient states that her significant other typically is able to care for her but has not been able to use over the past several days. Patient is covered in fecal matter. Denies chest pain, shortness of breath, abdominal pain. Denies facial droop, slurred speech. Patient poor historian. The history is provided by the patient and the EMS personnel. No  was used. Review of Systems   Constitutional:  Positive for fatigue. Negative for chills, diaphoresis and fever. HENT:  Negative for congestion and rhinorrhea. Eyes:  Negative for visual disturbance. Respiratory:  Negative for cough and shortness of breath. Cardiovascular:  Negative for chest pain. Gastrointestinal:  Negative for abdominal pain, blood in stool, constipation, nausea and vomiting. Genitourinary:  Positive for dysuria. Negative for flank pain. Musculoskeletal:  Negative for neck pain and neck stiffness. Skin:  Negative for rash. Neurological:  Negative for facial asymmetry, light-headedness, numbness and headaches. Hematological:  Does not bruise/bleed easily. Past Medical History:   Diagnosis Date    Anxiety and depression 7/13/2012    Anxiety and depression     Arteriopathy ([facility] [address] 7/21/2015    Last Assessment & Plan:  I DO NOT THINK A REPEAT YESICA (TRANSESOPHAGEAL ECHOCARDIOGRAM) IS INDICATED. SHE HAD SMALL (<5mm) ATHEROMA OF HER AORTA. THIS IS NOT A CLOT/THROMBUS AND IT IS NOT EXPECTED TO RESOLVE AFTER INITIATION OF COUMADIN.   WILL DEFER DECISION REGARDING COUMADIN DURATION TO NEUROLOGIST. PATIENT ADAMANT ABOUT STOPPING COUMADIN. NEEDS TO BE ON SOME ANTIPLATELETS (ASA VS ASA+PLAVIX)     CAD (coronary artery disease) 2012    Chronic hepatitis C ([facility] 2012    Chronic pain 2012    COPD (chronic obstructive pulmonary disease) ([facility] 2012    DM2 (diabetes mellitus, type 2) ([facility] 2012    DM2 (diabetes mellitus, type 2) ([facility] 2012    HLD (hyperlipidemia) 2012    HTN (hypertension) 2012    Ischemic embolic stroke ([facility]     Peripheral neuropathy 10/11/2013    Peripheral neuropathy 10/11/2013    Post-polio syndrome 2012    Post-polio syndrome 2012    Right fascicular block 2015    Spinal stenosis of cervical region 3/23/2012        Past Surgical History:   Procedure Laterality Date    CARDIAC CATHETERIZATION      CARPAL TUNNEL RELEASE       SECTION      ORTHOPEDIC SURGERY      bilateral knees    ORTHOPEDIC SURGERY      wrist        Family History   Problem Relation Age of Onset    Coronary Art Dis Maternal Grandfather     Coronary Art Dis Paternal Grandfather     Heart Disease Mother     Heart Disease Father     Cancer Mother     Diabetes Mother         Social History     Socioeconomic History    Marital status:    Tobacco Use    Smoking status: Every Day     Packs/day: 1.00     Types: Cigarettes    Smokeless tobacco: Never   Substance and Sexual Activity    Alcohol use: No     Alcohol/week: 0.0 standard drinks    Drug use: No         Promethazine     Previous Medications    ASPIRIN 81 MG EC TABLET    Take by mouth daily    ATORVASTATIN (LIPITOR) 80 MG TABLET    Take 80 mg by mouth daily    CLOPIDOGREL (PLAVIX) 75 MG TABLET    Take by mouth    DULOXETINE (CYMBALTA) 60 MG EXTENDED RELEASE CAPSULE    Take 60 mg by mouth daily    FLUCONAZOLE (DIFLUCAN) 150 MG TABLET    Take 150 mg by mouth daily    LANCETS MISC    One Touch Penlet Plus. Tests twice daily due to fluctuating blood sugar.  .00    LISINOPRIL (PRINIVIL;ZESTRIL) 10 MG TABLET    Take by mouth daily    METFORMIN (GLUCOPHAGE) 500 MG TABLET    Take by mouth daily (with breakfast)    METOPROLOL TARTRATE (LOPRESSOR) 50 MG TABLET    Take by mouth 2 times daily    MISC. DEVICES (WALKER) MISC    Pacheco Walker to be used to help mobility    NYSTATIN (MYCOSTATIN) 847485 UNIT/GM CREAM    Apply topically 2 times daily    TRAZODONE (DESYREL) 100 MG TABLET    Take 100 mg by mouth    VITAMIN D (CHOLECALCIFEROL) 25 MCG (1000 UT) TABS TABLET    Take by mouth daily        Vitals signs and nursing note reviewed. Patient Vitals for the past 4 hrs:   Temp Resp BP SpO2   02/06/23 0930 98.2 °F (36.8 °C) 18 123/79 93 %          Physical Exam  Vitals and nursing note reviewed. Constitutional:       Appearance: Normal appearance. Comments: Disheveled in appearance. Malodorous covered in fecal matter. HENT:      Head: Normocephalic. Comments: Atraumatic. Nose: Nose normal.      Mouth/Throat:      Mouth: Mucous membranes are moist.   Eyes:      Extraocular Movements: Extraocular movements intact. Pupils: Pupils are equal, round, and reactive to light. Neck:      Comments: FROM. No nuchal rigidity. Cardiovascular:      Rate and Rhythm: Normal rate. Pulses: Normal pulses. Heart sounds: Normal heart sounds. Pulmonary:      Effort: Pulmonary effort is normal.      Breath sounds: Normal breath sounds. Abdominal:      Palpations: Abdomen is soft. Tenderness: There is no abdominal tenderness. There is no guarding or rebound. Comments: Soft, nontender, nondistended. No rebound or guarding   Musculoskeletal:         General: No swelling, tenderness or deformity. Normal range of motion. Cervical back: Normal range of motion. No rigidity. Skin:     General: Skin is warm. Findings: No rash. Neurological:      General: No focal deficit present. Mental Status: She is alert. Mental status is at baseline. Cranial Nerves:  No cranial nerve deficit. Sensory: No sensory deficit. Comments: Residual right-sided deficits noted. No new focal deficits present. No facial droop. No dysarthria. Psychiatric:         Mood and Affect: Mood normal.         Behavior: Behavior normal.        EKG 12 Lead    Date/Time: 2/6/2023 9:47 AM  Performed by: Adria Velez MD  Authorized by: Adria Velez MD     ECG reviewed by ED Physician in the absence of a cardiologist: yes    Rate:     ECG rate:  87    ECG rate assessment: normal    Ectopy:     Ectopy: bigeminy    QRS:     QRS conduction: RBBB    ST segments:     ST segments:  Non-specific  T waves:     T waves: normal      Results for orders placed or performed during the hospital encounter of 02/06/23   Blood Culture 1    Specimen: Blood   Result Value Ref Range    Special Requests RIGHT FOREARM      Culture PENDING    XR CHEST PORTABLE    Narrative    Chest X-ray    INDICATION: Increasing weakness    AP/PA view of the chest was obtained. FINDINGS: The lungs are clear. There are no infiltrates or effusions. There is  mild cardiomegaly. The bony thorax is intact. Impression    No acute findings in the chest     CT HEAD WO CONTRAST    Narrative    Head CT    INDICATION: Increasing extremity weakness, history of prior stroke    Multiple axial images obtained through the brain without intravenous contrast.   Radiation dose reduction techniques were used for this study: All CT scans  performed at this facility use one or all of the following: Automated exposure  control, adjustment of the mA and/or kVp according to patient's size, iterative  reconstruction. COMPARISON: None    FINDINGS: There is left parietal encephalomalacia consistent with old left MCA  territory infarct. There are also chronic changes in the white matter. There is  no CT evidence of acute hemorrhage or infarction. The ventricles are normal in  size.  There are no extra-axial fluid collections. No masses are seen. The  sinuses are clear. There are no bony lesions. Impression    Old left-sided infarct. No CT evidence of acute intracranial  abnormality.    CMP   Result Value Ref Range    Sodium 135 133 - 143 mmol/L    Potassium 4.4 3.5 - 5.1 mmol/L    Chloride 106 101 - 110 mmol/L    CO2 21 21 - 32 mmol/L    Anion Gap 8 2 - 11 mmol/L    Glucose 98 65 - 100 mg/dL    BUN 13 8 - 23 MG/DL    Creatinine 0.80 0.6 - 1.0 MG/DL    Est, Glom Filt Rate >60 >60 ml/min/1.73m2    Calcium 8.8 8.3 - 10.4 MG/DL    Total Bilirubin 0.7 0.2 - 1.1 MG/DL    ALT 20 12 - 65 U/L    AST 17 15 - 37 U/L    Alk Phosphatase 76 50 - 136 U/L    Total Protein 7.2 6.3 - 8.2 g/dL    Albumin 2.8 (L) 3.2 - 4.6 g/dL    Globulin 4.4 2.8 - 4.5 g/dL    Albumin/Globulin Ratio 0.6 0.4 - 1.6     Urinalysis w rflx microscopic   Result Value Ref Range    Color, UA YELLOW/STRAW      Appearance CLOUDY      Specific Gravity, UA 1.007 1.001 - 1.023      pH, Urine 5.5 5.0 - 9.0      Protein, UA TRACE (A) NEG mg/dL    Glucose, UA Negative mg/dL    Ketones, Urine Negative NEG mg/dL    Bilirubin Urine Negative NEG      Blood, Urine TRACE (A) NEG      Urobilinogen, Urine 1.0 0.2 - 1.0 EU/dL    Nitrite, Urine Positive (A) NEG      Leukocyte Esterase, Urine LARGE (A) NEG      WBC, UA  0 /hpf    Epithelial Cells UA 10-20 0 /hpf    BACTERIA, URINE 4+ (H) 0 /hpf    Other observations RESULTS VERIFIED MANUALLY     Magnesium   Result Value Ref Range    Magnesium 2.0 1.8 - 2.4 mg/dL   CBC with Auto Differential   Result Value Ref Range    WBC 12.1 (H) 4.3 - 11.1 K/uL    RBC 5.15 4.05 - 5.2 M/uL    Hemoglobin 14.8 11.7 - 15.4 g/dL    Hematocrit 47.8 (H) 35.8 - 46.3 %    MCV 92.8 82 - 102 FL    MCH 28.7 26.1 - 32.9 PG    MCHC 31.0 (L) 31.4 - 35.0 g/dL    RDW 14.7 (H) 11.9 - 14.6 %    Platelets 332 032 - 382 K/uL    MPV 9.7 9.4 - 12.3 FL    nRBC 0.00 0.0 - 0.2 K/uL    Differential Type AUTOMATED      Seg Neutrophils 68 43 - 78 %    Lymphocytes 15 13 - 44 %    Monocytes 9 4.0 - 12.0 %    Eosinophils % 6 0.5 - 7.8 %    Basophils 1 0.0 - 2.0 %    Immature Granulocytes 1 0.0 - 5.0 %    Segs Absolute 8.3 (H) 1.7 - 8.2 K/UL    Absolute Lymph # 1.8 0.5 - 4.6 K/UL    Absolute Mono # 1.1 0.1 - 1.3 K/UL    Absolute Eos # 0.7 0.0 - 0.8 K/UL    Basophils Absolute 0.1 0.0 - 0.2 K/UL    Absolute Immature Granulocyte 0.2 0.0 - 0.5 K/UL   EKG 12 Lead   Result Value Ref Range    Ventricular Rate 87 BPM    Atrial Rate 38 BPM    P-R Interval 136 ms    QRS Duration 136 ms    Q-T Interval 414 ms    QTc Calculation (Bazett) 393 ms    P Axis 58 degrees    R Axis 4 degrees    T Axis -55 degrees    Diagnosis Sinus rhythm         CT HEAD WO CONTRAST   Final Result   Old left-sided infarct. No CT evidence of acute intracranial   abnormality. XR CHEST PORTABLE   Final Result   No acute findings in the chest            Despite having concern for UTI/sepsis, a standard fluid resuscitation of 30 mL/kg would harm the patient because the patient has Concern for fluid overload. Therefore, ordering a specific volume of 1,000 ml. Voice dictation software was used during the making of this note. This software is not perfect and grammatical and other typographical errors may be present. This note has not been completely proofread for errors.      Nikki Palma MD  02/06/23 [id number]

## 2023-02-07 LAB
ANION GAP SERPL CALC-SCNC: 5 MMOL/L (ref 2–11)
BASOPHILS # BLD: 0.1 K/UL (ref 0–0.2)
BASOPHILS NFR BLD: 1 % (ref 0–2)
BUN SERPL-MCNC: 9 MG/DL (ref 8–23)
CALCIUM SERPL-MCNC: 7.9 MG/DL (ref 8.3–10.4)
CHLORIDE SERPL-SCNC: 108 MMOL/L (ref 101–110)
CO2 SERPL-SCNC: 26 MMOL/L (ref 21–32)
CREAT SERPL-MCNC: 0.7 MG/DL (ref 0.6–1)
DIFFERENTIAL METHOD BLD: ABNORMAL
EOSINOPHIL # BLD: 0.4 K/UL (ref 0–0.8)
EOSINOPHIL NFR BLD: 4 % (ref 0.5–7.8)
ERYTHROCYTE [DISTWIDTH] IN BLOOD BY AUTOMATED COUNT: 14.6 % (ref 11.9–14.6)
EST. AVERAGE GLUCOSE BLD GHB EST-MCNC: 117 MG/DL
GLUCOSE BLD STRIP.AUTO-MCNC: 108 MG/DL (ref 65–100)
GLUCOSE BLD STRIP.AUTO-MCNC: 137 MG/DL (ref 65–100)
GLUCOSE BLD STRIP.AUTO-MCNC: 169 MG/DL (ref 65–100)
GLUCOSE BLD STRIP.AUTO-MCNC: 274 MG/DL (ref 65–100)
GLUCOSE SERPL-MCNC: 122 MG/DL (ref 65–100)
HBA1C MFR BLD: 5.7 % (ref 4.8–5.6)
HCT VFR BLD AUTO: 38.4 % (ref 35.8–46.3)
HGB BLD-MCNC: 11.8 G/DL (ref 11.7–15.4)
IMM GRANULOCYTES # BLD AUTO: 0.1 K/UL (ref 0–0.5)
IMM GRANULOCYTES NFR BLD AUTO: 1 % (ref 0–5)
LYMPHOCYTES # BLD: 1 K/UL (ref 0.5–4.6)
LYMPHOCYTES NFR BLD: 12 % (ref 13–44)
MCH RBC QN AUTO: 28.6 PG (ref 26.1–32.9)
MCHC RBC AUTO-ENTMCNC: 30.7 G/DL (ref 31.4–35)
MCV RBC AUTO: 93.2 FL (ref 82–102)
MM INDURATION, POC: 0 MM (ref 0–5)
MONOCYTES # BLD: 0.9 K/UL (ref 0.1–1.3)
MONOCYTES NFR BLD: 10 % (ref 4–12)
NEUTS SEG # BLD: 6.4 K/UL (ref 1.7–8.2)
NEUTS SEG NFR BLD: 73 % (ref 43–78)
NRBC # BLD: 0 K/UL (ref 0–0.2)
PLATELET # BLD AUTO: 320 K/UL (ref 150–450)
PMV BLD AUTO: 10.3 FL (ref 9.4–12.3)
POTASSIUM SERPL-SCNC: 3.9 MMOL/L (ref 3.5–5.1)
PPD, POC: NEGATIVE
RBC # BLD AUTO: 4.12 M/UL (ref 4.05–5.2)
SERVICE CMNT-IMP: ABNORMAL
SODIUM SERPL-SCNC: 139 MMOL/L (ref 133–143)
TSH, 3RD GENERATION: 1.12 UIU/ML (ref 0.36–3.74)
WBC # BLD AUTO: 8.8 K/UL (ref 4.3–11.1)

## 2023-02-07 PROCEDURE — 6370000000 HC RX 637 (ALT 250 FOR IP): Performed by: INTERNAL MEDICINE

## 2023-02-07 PROCEDURE — 2580000003 HC RX 258: Performed by: INTERNAL MEDICINE

## 2023-02-07 PROCEDURE — 97530 THERAPEUTIC ACTIVITIES: CPT

## 2023-02-07 PROCEDURE — 84443 ASSAY THYROID STIM HORMONE: CPT

## 2023-02-07 PROCEDURE — 83036 HEMOGLOBIN GLYCOSYLATED A1C: CPT

## 2023-02-07 PROCEDURE — 82962 GLUCOSE BLOOD TEST: CPT

## 2023-02-07 PROCEDURE — 97166 OT EVAL MOD COMPLEX 45 MIN: CPT

## 2023-02-07 PROCEDURE — 6360000002 HC RX W HCPCS: Performed by: INTERNAL MEDICINE

## 2023-02-07 PROCEDURE — 96376 TX/PRO/DX INJ SAME DRUG ADON: CPT

## 2023-02-07 PROCEDURE — 80048 BASIC METABOLIC PNL TOTAL CA: CPT

## 2023-02-07 PROCEDURE — 96372 THER/PROPH/DIAG INJ SC/IM: CPT

## 2023-02-07 PROCEDURE — G0378 HOSPITAL OBSERVATION PER HR: HCPCS

## 2023-02-07 PROCEDURE — 97112 NEUROMUSCULAR REEDUCATION: CPT

## 2023-02-07 PROCEDURE — 85025 COMPLETE CBC W/AUTO DIFF WBC: CPT

## 2023-02-07 PROCEDURE — 97535 SELF CARE MNGMENT TRAINING: CPT

## 2023-02-07 PROCEDURE — 36415 COLL VENOUS BLD VENIPUNCTURE: CPT

## 2023-02-07 PROCEDURE — 97162 PT EVAL MOD COMPLEX 30 MIN: CPT

## 2023-02-07 PROCEDURE — 2700000000 HC OXYGEN THERAPY PER DAY

## 2023-02-07 RX ORDER — ALBUTEROL SULFATE 2.5 MG/3ML
2.5 SOLUTION RESPIRATORY (INHALATION) EVERY 4 HOURS PRN
OUTPATIENT
Start: 2023-02-07

## 2023-02-07 RX ADMIN — SODIUM CHLORIDE, PRESERVATIVE FREE 10 ML: 5 INJECTION INTRAVENOUS at 20:37

## 2023-02-07 RX ADMIN — SODIUM CHLORIDE: 9 INJECTION, SOLUTION INTRAVENOUS at 01:07

## 2023-02-07 RX ADMIN — VITAMIN D, TAB 1000IU (100/BT) 1000 UNITS: 25 TAB at 10:00

## 2023-02-07 RX ADMIN — INSULIN LISPRO 4 UNITS: 100 INJECTION, SOLUTION INTRAVENOUS; SUBCUTANEOUS at 17:36

## 2023-02-07 RX ADMIN — ENOXAPARIN SODIUM 40 MG: 100 INJECTION SUBCUTANEOUS at 13:20

## 2023-02-07 RX ADMIN — TRAZODONE HYDROCHLORIDE 100 MG: 50 TABLET ORAL at 20:31

## 2023-02-07 RX ADMIN — CLOPIDOGREL BISULFATE 75 MG: 75 TABLET ORAL at 10:00

## 2023-02-07 RX ADMIN — SODIUM CHLORIDE, PRESERVATIVE FREE 5 ML: 5 INJECTION INTRAVENOUS at 10:14

## 2023-02-07 RX ADMIN — LISINOPRIL 5 MG: 5 TABLET ORAL at 10:00

## 2023-02-07 RX ADMIN — CEFTRIAXONE 1000 MG: 1 INJECTION, POWDER, FOR SOLUTION INTRAMUSCULAR; INTRAVENOUS at 11:18

## 2023-02-07 RX ADMIN — ASPIRIN 81 MG: 81 TABLET ORAL at 10:00

## 2023-02-07 RX ADMIN — METOPROLOL TARTRATE 25 MG: 25 TABLET, FILM COATED ORAL at 20:31

## 2023-02-07 RX ADMIN — SODIUM CHLORIDE: 9 INJECTION, SOLUTION INTRAVENOUS at 10:09

## 2023-02-07 RX ADMIN — DULOXETINE HYDROCHLORIDE 60 MG: 30 CAPSULE, DELAYED RELEASE ORAL at 10:00

## 2023-02-07 RX ADMIN — METOPROLOL TARTRATE 25 MG: 25 TABLET, FILM COATED ORAL at 10:00

## 2023-02-07 RX ADMIN — ATORVASTATIN CALCIUM 80 MG: 40 TABLET, FILM COATED ORAL at 20:30

## 2023-02-07 ASSESSMENT — PAIN SCALES - GENERAL
PAINLEVEL_OUTOF10: 0
PAINLEVEL_OUTOF10: 0

## 2023-02-07 NOTE — CARE COORDINATION
Patient would like to discharge to [facility] but made it clear that she doesn't want to stay a long time at [facility]. She requested [facility]. Referral has been made. Awaiting response.

## 2023-02-07 NOTE — PROGRESS NOTES
Night Cover Update      At shift change patient reportedly had an episode of emesis, afterwards she became hypoxemic and was given a treatment with DuoNebs and has been slowly weaned down from 8 L required to maintain a sat of 90% is now on 4 L satting roughly 9798%. On exam she does sound very coarse bilaterally and she does have upper airway gurgling noises, though she is not in any respiratory distress and has no complaints for me. Heart rate has been going well between the 70s in the low 30s as well the blood pressure is maintaining. Plan: Twelve-lead EKG and start telemetry, if patient has new fever or new hypoxemia, will repeat a chest x-ray and broaden antibiotics entheses already on ceftriaxone for UTI which would cover community-acquired pneumonia as well).

## 2023-02-07 NOTE — PROGRESS NOTES
Pt resting in bed quietly. Alert and oriented x 3 at this time. Respirations even and unlabored on RA. External cath in place and collecting yellow urine. Bed locked and low with alarm active. Pt instructed to use call light for assistance. No signs of distress. Pt denies any needs at this time. Report received from [facility] AT [facility]. Care plan to be continued at this time.

## 2023-02-07 NOTE — PROGRESS NOTES
Melissa Nails MD notified of the below. \"Pt admitted today with failure to thrive. On RA all day w SpO2 in the 90s. Recent vitals revealed 84% on RA. Pt now requiring 8 L HF to maintain at 93%. HR currently 44, while on monitor HR got as low as 36. HR has been 80-90s all day. /58. Pt not dyspniec. Courness heard on the R side at this time. Preparing to give albuterol treatment which should help with HR too. Pt had emesis episode right before shift change. Possible aspiration. \"     MD to come see pt shortly. See Flow sheets for details.

## 2023-02-07 NOTE — CONSULTS
Comprehensive Nutrition Assessment    Type and Reason for Visit: Initial, Consult  Poor Intake/Appetite 5 or More Days (Hospitalists)    Nutrition Recommendations/Plan:   Meals and Snacks:  Diet: Continue current order  Nutrition Supplement Therapy:  Medical food supplement therapy:  Initiate Glucerna Shake three times per day (this provides 220 kcal and 10 grams protein per bottle)     Malnutrition Assessment:  Malnutrition Status: Moderate malnutrition  Context: Chronic Illness  Findings of clinical characteristics of malnutrition:   Energy Intake:  Mild decrease in energy intake (Comment) (pt reports decreased po intermittently depending how she is feeling)  Weight Loss:  No significant weight loss     Body Fat Loss:  Mild body fat loss Orbital, Buccal region   Muscle Mass Loss:  Severe muscle mass loss Temples (temporalis), Clavicles (pectoralis & deltoids), Hand (interosseous)  Fluid Accumulation:  Unable to assess     Strength:  Not Performed     Nutrition Assessment:  Nutrition History: Pt reports eating at least 2 meals/day at baseline. She states she used to be in the [employer] so she is used to a structured day. States she wakes up and immediately takes her medications with food and then eats at least one other meal daily. States her meals are usually grits, burger zari burgers, pancakes, salads, or canned beans. She denies any recent weight loss and states her current weight is actually high for her. When asked what a normal weight is, pt states \"I weighed 120lbs in high school and 190 lb when I was pregnant\". Pt admits to intermittent po intake and states her food intake depends on how she is feeling that day. Nutrition Background:       PMH significant for osteoporosis, HTN, chronic hepatitis C, anxiety, depression, CAD, T2DM, and CVA. Pt presents this admission for weakness and fatigue. Was found to have UTI upon admission. Nutrition Interval:  RD met w/pt at bedside.  Pt provides above hx and states her appetite continues to be poor. She reports some nausea/vomiting yesterday but states this has resolved today. RD educated on importance of adequate po intake for malnutrition prevention. Pt is agreeable to consuming nutrition supplements during admission to help increase kcal/protein intake. Glucerna TID added. Current Nutrition Therapies:  ADULT DIET; Regular; 3 carb choices (45 gm/meal); Low Fat/Low Chol/High Fiber/MIKE    Current Intake:   Average Meal Intake: 1-25% (per patient) Average Supplements Intake: None Ordered      Anthropometric Measures:  Height: 5' 7\" (170.2 cm)  Current Body Wt: 142 lb 6.7 oz (64.6 kg) (2/7), Weight source: Bed Scale  BMI: 22.3, Normal Weight (BMI 22.0 to 24.9) age over 72  Admission Body Weight: 142 lb 6.7 oz (64.6 kg)  Ideal Body Weight (Kg) (Calculated): 61 kg (135 lbs), 105.5 %  Usual Body Wt:  , Percent weight change:         BMI Category Normal Weight (BMI 22.0 to 24.9) age over 72    Estimated Daily Nutrient Needs:  Energy (kcal/day): 5212-6862 (20-25 kcal/kg) (Kcal/kg Weight used: 64.6 kg Current  Protein (g/day): 65-78 (1.0-1.2 g/kg) Weight Used: (Current) 64.6 kg  Fluid (ml/day):   (1 ml/kcal)    Nutrition Diagnosis:   Inadequate oral intake related to  (poor appetite) as evidenced by intake 0-25%  Moderate malnutrition related to inadequate protein-energy intake as evidenced by Criteria as identified in malnutrition assessment    Nutrition Interventions:   Food and/or Nutrient Delivery: Continue Current Diet, Start Oral Nutrition Supplement  Nutrition Education/Counseling: No recommendation at this time  Coordination of Nutrition Care: Continue to monitor while inpatient  Plan of Care discussed with: patient    Goals:       Active Goal: PO intake 50% or greater, by next RD assessment       Nutrition Monitoring and Evaluation:      Food/Nutrient Intake Outcomes: Food and Nutrient Intake, Supplement Intake  Physical Signs/Symptoms Outcomes: Weight, Meal Time Behavior, Nausea or Vomiting    Discharge Planning:    Continue Oral Nutrition Supplement    Ramana Holley, RD

## 2023-02-07 NOTE — PROGRESS NOTES
ACUTE PHYSICAL THERAPY GOALS:   (Developed with and agreed upon by patient and/or caregiver. )  LTG:  (1.)Ms. Izaiah Cabrera will move from supine to sit and sit to supine , scoot up and down, and roll side to side in bed with MINIMAL ASSIST within 7 treatment day(s). (2.)Ms. Izaiah Cabrera will transfer from bed to chair and chair to bed with MODERATE ASSIST using the least restrictive device within 7 treatment day(s). (3.)Ms. Izaiah Cabrera will perform seated exercises per HEP for 15+ minutes to improve strength and mobility within 7 treatment day(s). ________________________________________________________________________________________________    PHYSICAL THERAPY Initial Assessment and AM  (Link to Caseload Tracking: PT Visit Days : 1  Acknowledge Orders  Time In/Out  PT Charge Capture  Rehab Caseload Tracker    Sruthi Khan is a 81 y.o. female   PRIMARY DIAGNOSIS: Failure to thrive in adult  Generalized weakness [R53.1]  Acute UTI [N39.0]  Failure to thrive in adult [R62.7]       Reason for Referral: Generalized Muscle Weakness (M62.81)  Difficulty in walking, Not elsewhere classified (R26.2)  Other abnormalities of gait and mobility (R26.89)  Observation: Payor: [facility] MEDICARE / Plan: [employer] DUAL COMPLETE / Product Type: *No Product type* /     ASSESSMENT:     REHAB RECOMMENDATIONS:   Recommendation to date pending progress:  Setting:  Short-term Rehab    Equipment:    To Be Determined     ASSESSMENT:  Ms. Izaiah Cabrera is admitted from home with weakness, UTI, failure to thrive. She lives with son who works and a friend. She is able to transfer to wheelchair at baseline per her report with assistance from friend. 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 Also states history of polio as a child with left lower extremity weakness. Pt needs max assist of 2 for bed mobility and supine <-> sit transfers with very strong posterior push in sitting. Poor balance.  Unsafe to attempt further transfers at this time due to pt weakness and balance. Returned to supine and repositioned comfortably. Ms. Jada Iglesias appears to be functioning well below baseline with mobility, balance, and transfer ability. She would benefit from continued therapy during hospital stay and rehab at discharge to maximize safety/independence. [address] [facility] [address] [id number] AM-PAC 6 Clicks Basic Mobility Inpatient Short Form  AM-PAC Basic Mobility - Inpatient   How much help is needed turning from your back to your side while in a flat bed without using bedrails?: A Lot  How much help is needed moving from lying on your back to sitting on the side of a flat bed without using bedrails?: A Lot  How much help is needed moving to and from a bed to a chair?: Total  How much help is needed standing up from a chair using your arms?: Total  How much help is needed walking in hospital room?: Total  How much help is needed climbing 3-5 steps with a railing?: Total  AM-PAC Inpatient Mobility Raw Score : 8  AM-PAC Inpatient T-Scale Score : 28.52  Mobility Inpatient CMS 0-100% Score: 86.62  Mobility Inpatient CMS G-Code Modifier : CM    SUBJECTIVE:   Ms. Jada Iglesias states, \"How was that? \"     Social/Functional Lives With: Son, Other (comment)  Type of Home: House  Home Layout: One level  Home Access: Ramped entrance  [employer] Help From: Family  ADL Assistance: Needs assistance  Homemaking Assistance: Needs assistance  Homemaking Responsibilities: No  Ambulation Assistance: Needs assistance  Transfer Assistance: Needs assistance  Active : No    OBJECTIVE:     PAIN: VITALS / O2: PRECAUTION / Henery Bur / DRAINS:   Pre Treatment:          Post Treatment: 0/10 Vitals        Oxygen RA           RESTRICTIONS/PRECAUTIONS:  Restrictions/Precautions: Fall Risk                 GROSS EVALUATION: Intact Impaired (Comments):   AROM []  Limited B LE   PROM []    Strength []  B LE weak   Balance []  Poor seated balance with strong posterior lean/push   Posture [] Forward Head  Rounded Shoulders  Strong posterior lean   Sensation []  Present B LE/feet   Coordination []      Tone []     Edema []    Activity Tolerance []  poor    []      COGNITION/  PERCEPTION: Intact Impaired (Comments):   Orientation []     Vision []     Hearing []     Cognition  []  impaired     MOBILITY: I Mod I S SBA CGA Min Mod Max Total  NT x2 Comments:   Bed Mobility    Rolling [] [] [] [] [] [] [] [x] [] [] [x]    Supine to Sit [] [] [] [] [] [] [] [x] [] [] [x]    Scooting [] [] [] [] [] [] [] [x] [] [] [x]    Sit to Supine [] [] [] [] [] [] [] [x] [] [] [x]    Transfers    Sit to Stand [] [] [] [] [] [] [] [] [] [x] []    Bed to Chair [] [] [] [] [] [] [] [] [] [x] []    Stand to Sit [] [] [] [] [] [] [] [] [] [x] []     [] [] [] [] [] [] [] [] [] [] []    I=Independent, Mod I=Modified Independent, S=Supervision, SBA=Standby Assistance, CGA=Contact Guard Assistance,   Min=Minimal Assistance, Mod=Moderate Assistance, Max=Maximal Assistance, Total=Total Assistance, NT=Not Tested    GAIT: I Mod I S SBA CGA Min Mod Max Total  NT x2 Comments:   Level of Assistance [] [] [] [] [] [] [] [] [] [x] [] Non ambulatory at baseline   Distance NA    DME N/A    Gait Quality N/A    Weightbearing Status      Stairs      I=Independent, Mod I=Modified Independent, S=Supervision, SBA=Standby Assistance, CGA=Contact Guard Assistance,   Min=Minimal Assistance, Mod=Moderate Assistance, Max=Maximal Assistance, Total=Total Assistance, NT=Not Tested    PLAN:   FREQUENCY AND DURATION: 3 times/week for duration of hospital stay or until stated goals are met, whichever comes first.    THERAPY PROGNOSIS: Fair    PROBLEM LIST:   (Skilled intervention is medically necessary to address:)  Decreased Activity Tolerance  Decreased AROM/PROM  Decreased Balance  Decreased Cognition  Decreased Safety Awareness  Decreased Strength  Decreased Transfer Abilities INTERVENTIONS PLANNED:   (Benefits and precautions of physical therapy have been discussed with the patient.)  Therapeutic Activity  Therapeutic Exercise/HEP  Neuromuscular Re-education  Education       TREATMENT:   EVALUATION: MODERATE COMPLEXITY: (Untimed Charge)    TREATMENT:   Co-Treatment PT/OT necessary due to patient's decreased overall endurance/tolerance levels, as well as need for high level skilled assistance to complete functional transfers/mobility and functional tasks  Therapeutic Activity (8 Minutes): Therapeutic activity included Rolling, Supine to Sit, Sit to Supine, Scooting, and Sitting balance  to improve functional Activity tolerance, Balance, Coordination, Mobility, and Strength.     TREATMENT GRID:  N/A    AFTER TREATMENT PRECAUTIONS: Alarm Activated, Bed, Bed/Chair Locked, Call light within reach, and Needs within reach    INTERDISCIPLINARY COLLABORATION:  RN/ PCT, PT/ PTA, OT/ STRATTON, and RN Case Manager/      EDUCATION: Education Given To: Patient  Education Provided: Role of Therapy;Plan of Care  Education Method: Verbal  Barriers to Learning: Cognition;Lack of Family Support  Education Outcome: Continued education needed    TIME IN/OUT:  Time In: 1059  Time Out: [address]  Minutes: [address], PT

## 2023-02-07 NOTE — PROGRESS NOTES
ACUTE OCCUPATIONAL THERAPY GOALS:   (Developed with and agreed upon by patient and/or caregiver.)  1) Patient will complete self feeding with setup and adaptive equipment as needed. 2) Patient will complete grooming with setup. 3) Patient will complete functional transfers with max A and adaptive equipment as needed. 4) Patient will tolerate at least 15 minutes of OT activity with as needed rest breaks while maintaining O2 sats >90%. 5) Patient will verbalize at least 3 energy conservation technique to utilize during ADL/IADL. Timeframe: 7 visits      OCCUPATIONAL THERAPY Initial Assessment, Daily Note, and AM       OT Visit Days: 1  Acknowledge Orders  Time  OT Charge Capture  Rehab Caseload Tracker      Matias Oppenheim is a 81 y.o. female   PRIMARY DIAGNOSIS: Failure to thrive in adult  Generalized weakness [R53.1]  Acute UTI [N39.0]  Failure to thrive in adult [R62.7]       Reason for Referral: Generalized Muscle Weakness (M62.81)  Other lack of cordination (R27.8)  Difficulty in walking, Not elsewhere classified (R26.2)  Other abnormalities of gait and mobility (R26.89)  Observation: Payor: [facility] MEDICARE / Plan: [employer] DUAL COMPLETE / Product Type: *No Product type* /     ASSESSMENT:     REHAB RECOMMENDATIONS:   Recommendation to date pending progress:  Setting:  No further skilled therapy after discharge from hospital    Equipment:    None     ASSESSMENT:  Ms. Isacc Sosa is a 80 YO now L dominant WF admitted with failure to thrive. Pt lives with son in 1 level home with ramp access, and at baseline is WC bound or bed bound, can not push herself in the [facility], is Dep with all ADLs and Transfers, except reports she was able to feed herself with her L UE, incontinent. Today, pt A & O x4. B LEs do not appear to have walked in a long time, dry flaky feet, poor nail condition in hands and feet, poor dentition, poor hair care.  R Hand stays in severe flexed wrist posture and has tone and contractures, unable to extend the digits out. Reaching with L UE, but otherwise, pt appears near her baseline. Max Ax2 up to sitting on edge of bed and pt pushing and leaning posteriorly. Poor sitting balance. Returned to supine with max A x2 and made comfortable. Able to wash face and self feed with L UE. Pt appears to be near her baseline, but has decreased in her ability to full self feed and complete grooming and upper body tasks. Will follow in acute care  for goals as above and suspect will need further rehab. [address] [facility] [address] [id number] AM-PAC 6 Clicks Daily Activity Inpatient Short Form:    AM-PAC Daily Activity - Inpatient   How much help is needed for putting on and taking off regular lower body clothing?: Total  How much help is needed for bathing (which includes washing, rinsing, drying)?: Total  How much help is needed for toileting (which includes using toilet, bedpan, or urinal)?: Total  How much help is needed for putting on and taking off regular upper body clothing?: Total  How much help is needed for taking care of personal grooming?: Total  How much help for eating meals?: A Lot  [facility] Inpatient Daily Activity Raw Score: 7  AM-PAC Inpatient ADL T-Scale Score : 20.13  ADL Inpatient CMS 0-100% Score: 92.44  ADL Inpatient CMS G-Code Modifier : CM          SUBJECTIVE:     Ms. Elly Betts states, \"Keenan helps me do everything. My son works. \"     Social/Functional Lives With: Son, Other (comment)  Type of Home: House  Home Layout: One level  Home Access: Ramped entrance  [address] Help From: Family  ADL Assistance: Needs assistance  Homemaking Assistance: Needs assistance  Homemaking Responsibilities: No  Ambulation Assistance: Needs assistance  Transfer Assistance: Needs assistance  Active : No    OBJECTIVE:     Tiffany Joseph / Tomas Distad / Mellody Mate: Marlou Councilman, and adult brief    RESTRICTIONS/PRECAUTIONS:  Restrictions/Precautions: Fall Risk    PAIN: VITALS / O2:   Pre Treatment: no complaint of pain         Post Treatment: no complaint of pain       Vitals stable         Oxygen 2L NC            GROSS EVALUATION: INTACT IMPAIRED   (See Comments)   UE AROM [] []Very limited R UE, R wrist /hand postured in severe flexion, L UE appears WFLs   UE PROM [] []   Strength []  Grossly decreased     Posture / Balance []  Poor sitting balance, pushing, rounded shoulders, forward head, trunk flexion   Sensation []  Impaired R UE compared to L UE   Coordination []  Impaired to absent     Tone []  R UE     Edema []    Activity Tolerance []  Generally decreased, but probably not far from baseline     Hand Dominance R [] L [x] Was R dominant prior to prior CVA     COGNITION/  PERCEPTION: INTACT IMPAIRED   (See Comments)   Orientation [x]  x4   Vision []  decreased   Hearing []  Slightly hard of hearing   Cognition  []  Impaired    Perception []  impaired     MOBILITY: I Mod I S SBA CGA Min Mod Max Total  NT x2 Comments:   Bed Mobility    Rolling [] [] [] [] [] [] [] [x] [] [] [x]    Supine to Sit [] [] [] [] [] [] [] [x] [x] [] [x]    Scooting [] [] [] [] [] [] [] [] [x] [] [x]    Sit to Supine [] [] [] [] [] [] [] [x] [x] [] [x]    Transfers    Sit to Stand [] [] [] [] [] [] [] [] [] [x] [] Not safe   Bed to Chair [] [] [] [] [] [] [] [] [] [x] []    Stand to Sit [] [] [] [] [] [] [] [] [] [x] []    Tub/Shower [] [] [] [] [] [] [] [] [] [x] []     Toilet [] [] [] [] [] [] [] [] [] [x] []      [] [] [] [] [] [] [] [] [] [] []    I=Independent, Mod I=Modified Independent, S=Supervision/Setup, SBA=Standby Assistance, CGA=Contact Guard Assistance, Min=Minimal Assistance, Mod=Moderate Assistance, Max=Maximal Assistance, Total=Total Assistance, NT=Not Tested    ACTIVITIES OF DAILY LIVING: I Mod I S SBA CGA Min Mod Max Total NT Comments   BASIC ADLs:              Upper Body Bathing  [] [] [] [] [] [] [] [] [] [x]    Lower Body Bathing [] [] [] [] [] [] [] [] [] [x]    Toileting [] [] [] [] [] [] [] [] [x] []    Upper Body Dressing [] [] [] [] [] [] [] [] [x] []    Lower Body Dressing [] [] [] [] [] [] [] [] [x] []    Feeding [] [] [] [] [] [x] [] [] [] [] Holding a cup to drink   Grooming [] [] [] [] [] [] [] [] [x] [] Brushing hair and washing face with L UE   Personal Device Care [] [] [] [] [] [] [] [] [] [x]    Functional Mobility [] [] [] [] [] [] [] [] [x] []    I=Independent, Mod I=Modified Independent, S=Supervision/Setup, SBA=Standby Assistance, CGA=Contact Guard Assistance, Min=Minimal Assistance, Mod=Moderate Assistance, Max=Maximal Assistance, Total=Total Assistance, NT=Not Tested    PLAN:   [address] [place] [address] of Care: 3 times/week for duration of hospital stay or until goals are met, whichever comes first.     PROBLEM LIST:   (Skilled intervention is medically necessary to address:)  Decreased ADL/Functional Activities  Decreased Activity Tolerance  Decreased AROM/PROM  Decreased Balance  Decreased Cognition  Decreased Coordination  Decreased Gait Ability  Decreased Safety Awareness  Decreased Strength  Decreased Transfer Abilities   INTERVENTIONS PLANNED:  (Benefits and precautions of occupational therapy have been discussed with the patient.)  Self Care Training  Therapeutic Activity  Neuromuscular Re-education  Education  Above completed         TREATMENT:     EVALUATION: MODERATE COMPLEXITY: (Untimed Charge)    TREATMENT:   Co-Treatment PT/OT necessary due to patient's decreased overall endurance/tolerance levels, as well as need for high level skilled assistance to complete functional transfers/mobility and functional tasks  Neuromuscular Re-education (16 Minutes): Patient participated in neuromuscular re-education including functional reaching, weightbearing through affected upper extremity, weight shifting, postural training, visual scanning activity, midline training, positioning of affected robert-body, and muscle facilitation of R UE muscles  with maximal assistance, verbal cues, tactile cues, visual cues, and education to improve sitting balance, standing balance, awareness of affected robert-body, muscle hypertonicity , proprioception, posture, and coordination in order to prepare for functional task, prepare for seated ADLs, prepare for functional transfer, increase safety awareness, improve safety of affected upper extremity , improve functional usage of affected upper extremity, and prepare for discharge home . Self Care (10 minutes): Patient participated in upper body dressing, lower body dressing, self feeding, and grooming ADLs in supported sitting, unsupported sitting, and supine with maximal visual, verbal, manual, and tactile cueing to increase independence, decrease assistance required, increase activity tolerance, increase safety awareness, and maintain precautions. Patient also participated in functional mobility, energy conservation, and adaptive equipment training to increase independence, decrease assistance required, increase activity tolerance, increase safety awareness, and maintain precautions. TREATMENT GRID:  N/A    AFTER TREATMENT PRECAUTIONS: Alarm Activated, Bed, Bed/Chair Locked, Call light within reach, Heels floated, Needs within reach, RN notified, and Side rails x3    INTERDISCIPLINARY COLLABORATION:  RN/ PCT, PT/ PTA, OT/ STRATTON, and RN Case Manager/      EDUCATION:  Education Given To: Patient;Staff  Education Provided: Role of Therapy;Plan of Care;Precautions; Fall Prevention Strategies  Education Provided Comments: positioning of R UE  Education Method: Demonstration;Verbal  Barriers to Learning: Cognition;Lack of Family Support  Education Outcome: Verbalized understanding;Continued education needed    TOTAL TREATMENT DURATION AND TIME:  Time In: 1059  Time Out: [address]  Minutes: 26    KAMLESH HERNANDEZ, MS, OTR/L

## 2023-02-07 NOTE — PROGRESS NOTES
Hospitalist Progress Note   Admit Date:  2023  9:20 AM   Name:  Nimco Gomez   Age:  81 y.o. Sex:  female  :  1950   MRN:  [id number]   Room:  [facility]/    Presenting Complaint: Incontinence and Fatigue     Reason(s) for Admission: Generalized weakness [R53.1]  Acute UTI [N39.0]  Failure to thrive in adult [R62.7]     Hospital Course:     Copied from admission history and physical HPI:  62-year-old female with history of osteoporosis, hypertension, chronic hepatitis C, anxiety, depression, CAD, type 2 diabetes mellitus, CVA with residual right-sided weakness who presents via EMS with complaint of generalized weakness, fatigue. Vital signs stable. Patient with residual right-sided deficits. Patient covered in fecal matter. Son lives with her but he works outside the home. There is a friend who lives with them named Jazzy Bergeron and he is in the home 24 hours daily but she reports he cannot provide any assistance for her. She has had progressive weakness and reportedly became concerned because she could no longer get up and bear weight on her knees. She has brought in covered in feces and apparently bedbound unknown period of time. Discussed goals of therapy to include treat UTI as discovered. IV hydration supportive care physical therapy eval and possible recommendations for strengthening and nutritional support. She became very upset and screamed I am not going to any nursing home and claims she would refuse any recommended placement. We discussed initial goal of IV hydration treat UTI and evaluation for proper intervention for patient's safety. She reports her son will be available after 3 PM after he gets off work but is not able to care for her 24 hours daily. She has chronic right-sided weakness regarding old CVA. Oral intake appears poor. She continues to smoke cigarettes has known COPD and has mild wheeze at time of admission.   She is afebrile no SIRS criteria with exception of white blood count 12,100. Blood and urine cultures have been ordered and she has gram Rocephin IV. She will be admitted to observation for failure to thrive and treatment as outlined with IV hydration await cultures and empiric ceftriaxone. Subjective & 24hr Events (02/07/23): Patient admitted with UTI with weakness unable to bear weight-covered in feces-unable to care for self. Son works outside of home. Does have a family friend who stays with him named Henry Lopez. Leukocytosis on admission has resolved. Last evening she had episode of vomitus followed by some respiratory distress and possible aspiration but no fevers shaking chills or increased white count. She does smoke cigarettes despite her known diagnosis of COPD. Currently receiving nicotine patch. Overall she reports she feels some better since admission and is agreeable to short-term rehab but wants to get stronger to return to prior setting with son and Henry Lopez. No further vomiting, nausea or diarrhea. No high fevers or shaking chills. No chest pain. No productive cough. Assessment & Plan:      Principal Problem:    Failure to thrive in adult  Plan: PT OT eval, IV hydration, PPD. Dietitian evaluation and nutritional support. 2/7--placement short-term rehab. Vomitus with respiratory distress following evening of February 6 2/7-monitor for signs and symptoms of aspiration pneumonitis/pneumonia. Consider chest x-ray if leukocytosis, fevers or significant cough develops. Active Problems:    UTI (urinary tract infection)  Plan: Send urine culture, blood cultures, IV fluids and empiric Rocephin  2/7--urine cultures no growth to date. Essential hypertension  Plan: Continue home meds-need to clarify dose. Chronic hepatitis C without hepatic coma (HCC)  Plan: Noted      Anxiety and depression  Plan: Continue home medications.   Denies depressive thoughts or suicidal ideations      Coronary artery disease involving native coronary artery of native heart without angina pectoris  Plan: Denies anginal equivalent. No diaphoresis. Continue home meds. Type 2 diabetes mellitus without complication, without long-term current use of insulin (HCC)  Plan: Hold metformin-sliding scale insulin prandial coverage as needed  2/7--continue same-monitor sugars      Panlobular emphysema (HCC)  Plan: Nicotine patch as needed for nicotine addiction and albuterol as needed wheezing  2/7--add albuterol as needed       Anticipated discharge needs:   Pending clinical course               Anticipated discharge needs:    Short-term rehab-bed search started    Diet:  ADULT DIET; Regular; 3 carb choices (45 gm/meal); Low Fat/Low Chol/High Fiber/MIKE  ADULT ORAL NUTRITION SUPPLEMENT; Breakfast, Lunch, Dinner; Diabetic Oral Supplement  DVT PPx: Lovenox  Code status: Full Code    Hospital Problems:  Principal Problem:    Failure to thrive in adult  Active Problems:    UTI (urinary tract infection)    Essential hypertension    Chronic hepatitis C without hepatic coma (HCC)    Anxiety and depression    Coronary artery disease involving native coronary artery of native heart without angina pectoris    Type 2 diabetes mellitus without complication, without long-term current use of insulin (HCC)    Panlobular emphysema (HCC)  Resolved Problems:    * No resolved hospital problems.  *      Objective:   Patient Vitals for the past 24 hrs:   Temp Pulse Resp BP SpO2   02/07/23 1138 97.2 °F (36.2 °C) -- 17 128/63 99 %   02/07/23 0743 97.9 °F (36.6 °C) 64 19 131/83 98 %   02/07/23 0349 97.2 °F (36.2 °C) 55 17 129/74 96 %   02/06/23 2334 97.8 °F (36.6 °C) 63 17 119/66 98 %   02/06/23 2132 -- 94 -- -- --   02/06/23 2107 -- (!) 48 -- (!) 151/58 97 %   02/06/23 2045 -- (!) 46 -- -- 94 %   02/06/23 2034 -- (!) 45 18 -- 100 %   02/06/23 2032 -- (!) 44 -- -- 100 %   02/06/23 2031 -- (!) 42 20 -- 100 %   02/06/23 2025 -- (!) 45 20 -- 94 %   02/06/23 2008 -- -- -- -- 90 %   02/06/23 2005 -- -- -- -- (!) 87 %   02/06/23 2003 97.9 °F (36.6 °C) (!) 43 18 (!) 126/58 90 %   02/06/23 1958 -- -- -- -- (!) 84 %   02/06/23 1510 98.2 °F (36.8 °C) 93 18 (!) 153/81 94 %       Oxygen Therapy  SpO2: 99 %  Pulse Oximeter Device Mode: Intermittent  O2 Device: High flow nasal cannula  Skin Assessment: Clean, dry, & intact  Skin Protection for O2 Device: No  O2 Flow Rate (L/min): 3 L/min    Estimated body mass index is 22.3 kg/m² as calculated from the following:    Height as of this encounter: 5' 7\" (1.702 m). Weight as of this encounter: 142 lb 6.4 oz (64.6 kg). Intake/Output Summary (Last 24 hours) at 2/7/2023 1438  Last data filed at 2/7/2023 1436  Gross per 24 hour   Intake 360 ml   Output 700 ml   Net -340 ml         Physical Exam:     Blood pressure 128/63, pulse 64, temperature 97.2 °F (36.2 °C), temperature source Oral, resp. rate 17, height 5' 7\" (1.702 m), weight 142 lb 6.4 oz (64.6 kg), SpO2 99 %. General:    Lean. Alert and oriented x3. Pleasant and cooperative  Head:  Normocephalic, atraumatic  Eyes:  Sclerae appear normal.  Pupils equally round. ENT:  Nares appear normal.  Moist oral mucosa-poor dentition  Neck:  No restricted ROM. Trachea midline   CV:   RRR. No m/r/g. No jugular venous distension. Lungs:   Bilateral wheeze. Fair air movement. No gross consolidation. No pulmonary rales. Abdomen:   Soft, nontender, nondistended. Extremities: No cyanosis or clubbing. No unilateral lower extremity edema  Skin:     No rashes and normal coloration. Warm and dry. Neuro:  CN II-XII grossly intact. No focal motor weakness. Psych:  Normal mood and affect.       I have personally reviewed labs and tests:  Recent Labs:  Recent Results (from the past 48 hour(s))   EKG 12 Lead    Collection Time: 02/06/23  9:36 AM   Result Value Ref Range    Ventricular Rate 87 BPM    Atrial Rate 38 BPM    P-R Interval 136 ms    QRS Duration 136 ms    Q-T Interval 414 ms    QTc Calculation (Bazett) 393 ms P Axis 58 degrees    R Axis 4 degrees    T Axis -55 degrees    Diagnosis Sinus rhythm    CMP    Collection Time: 02/06/23 10:00 AM   Result Value Ref Range    Sodium 135 133 - 143 mmol/L    Potassium 4.4 3.5 - 5.1 mmol/L    Chloride 106 101 - 110 mmol/L    CO2 21 21 - 32 mmol/L    Anion Gap 8 2 - 11 mmol/L    Glucose 98 65 - 100 mg/dL    BUN 13 8 - 23 MG/DL    Creatinine 0.80 0.6 - 1.0 MG/DL    Est, Glom Filt Rate >60 >60 ml/min/1.73m2    Calcium 8.8 8.3 - 10.4 MG/DL    Total Bilirubin 0.7 0.2 - 1.1 MG/DL    ALT 20 12 - 65 U/L    AST 17 15 - 37 U/L    Alk Phosphatase 76 50 - 136 U/L    Total Protein 7.2 6.3 - 8.2 g/dL    Albumin 2.8 (L) 3.2 - 4.6 g/dL    Globulin 4.4 2.8 - 4.5 g/dL    Albumin/Globulin Ratio 0.6 0.4 - 1.6     Magnesium    Collection Time: 02/06/23 10:00 AM   Result Value Ref Range    Magnesium 2.0 1.8 - 2.4 mg/dL   Procalcitonin    Collection Time: 02/06/23 10:00 AM   Result Value Ref Range    Procalcitonin <0.05 0.00 - 0.49 ng/mL   Urinalysis w rflx microscopic    Collection Time: 02/06/23 11:06 AM   Result Value Ref Range    Color, UA YELLOW/STRAW      Appearance CLOUDY      Specific Gravity, UA 1.007 1.001 - 1.023      pH, Urine 5.5 5.0 - 9.0      Protein, UA TRACE (A) NEG mg/dL    Glucose, UA Negative mg/dL    Ketones, Urine Negative NEG mg/dL    Bilirubin Urine Negative NEG      Blood, Urine TRACE (A) NEG      Urobilinogen, Urine 1.0 0.2 - 1.0 EU/dL    Nitrite, Urine Positive (A) NEG      Leukocyte Esterase, Urine LARGE (A) NEG      WBC, UA  0 /hpf    Epithelial Cells UA 10-20 0 /hpf    BACTERIA, URINE 4+ (H) 0 /hpf    Other observations RESULTS VERIFIED MANUALLY     CBC with Auto Differential    Collection Time: 02/06/23 11:06 AM   Result Value Ref Range    WBC 12.1 (H) 4.3 - 11.1 K/uL    RBC 5.15 4.05 - 5.2 M/uL    Hemoglobin 14.8 11.7 - 15.4 g/dL    Hematocrit 47.8 (H) 35.8 - 46.3 %    MCV 92.8 82 - 102 FL    MCH 28.7 26.1 - 32.9 PG    MCHC 31.0 (L) 31.4 - 35.0 g/dL    RDW 14.7 (H) 11.9 - 14.6 %    Platelets 463 764 - 615 K/uL    MPV 9.7 9.4 - 12.3 FL    nRBC 0.00 0.0 - 0.2 K/uL    Differential Type AUTOMATED      Seg Neutrophils 68 43 - 78 %    Lymphocytes 15 13 - 44 %    Monocytes 9 4.0 - 12.0 %    Eosinophils % 6 0.5 - 7.8 %    Basophils 1 0.0 - 2.0 %    Immature Granulocytes 1 0.0 - 5.0 %    Segs Absolute 8.3 (H) 1.7 - 8.2 K/UL    Absolute Lymph # 1.8 0.5 - 4.6 K/UL    Absolute Mono # 1.1 0.1 - 1.3 K/UL    Absolute Eos # 0.7 0.0 - 0.8 K/UL    Basophils Absolute 0.1 0.0 - 0.2 K/UL    Absolute Immature Granulocyte 0.2 0.0 - 0.5 K/UL   Blood Culture 1    Collection Time: 02/06/23 11:43 AM    Specimen: Blood   Result Value Ref Range    Special Requests RIGHT FOREARM      Culture NO GROWTH AFTER 19 HOURS     Culture, Urine    Collection Time: 02/06/23 11:45 AM    Specimen: Urine, clean catch   Result Value Ref Range    Special Requests NO SPECIAL REQUESTS      Culture        No growth after short period of incubation. Further results to follow after overnight incubation.    Blood Culture 2    Collection Time: 02/06/23 12:52 PM    Specimen: Blood   Result Value Ref Range    Special Requests LEFT  Antecubital        Culture NO GROWTH AFTER 18 HOURS     Lactate, Sepsis    Collection Time: 02/06/23 12:52 PM   Result Value Ref Range    Lactic Acid, Sepsis 2.6 (H) 0.4 - 2.0 MMOL/L   Lactate, Sepsis    Collection Time: 02/06/23  4:04 PM   Result Value Ref Range    Lactic Acid, Sepsis 1.9 0.4 - 2.0 MMOL/L   Culture, Blood 1    Collection Time: 02/06/23  4:04 PM    Specimen: Blood   Result Value Ref Range    Special Requests LEFT  Antecubital        Culture NO GROWTH AFTER 15 HOURS     POCT Glucose    Collection Time: 02/06/23  4:16 PM   Result Value Ref Range    POC Glucose 141 (H) 65 - 100 mg/dL    Performed by: Amy Izquierdo    POCT Glucose    Collection Time: 02/06/23  8:13 PM   Result Value Ref Range    POC Glucose 118 (H) 65 - 100 mg/dL    Performed by: ElisabethxisPCT    EKG 12 Lead Collection Time: 02/06/23  9:17 PM   Result Value Ref Range    Ventricular Rate 96 BPM    Atrial Rate 96 BPM    P-R Interval 132 ms    QRS Duration 132 ms    Q-T Interval 394 ms    QTc Calculation (Bazett) 497 ms    P Axis 58 degrees    R Axis 4 degrees    T Axis -28 degrees    Diagnosis       Sinus rhythm with frequent Premature ventricular complexes   Basic Metabolic Panel w/ Reflex to MG    Collection Time: 02/07/23  3:15 AM   Result Value Ref Range    Sodium 139 133 - 143 mmol/L    Potassium 3.9 3.5 - 5.1 mmol/L    Chloride 108 101 - 110 mmol/L    CO2 26 21 - 32 mmol/L    Anion Gap 5 2 - 11 mmol/L    Glucose 122 (H) 65 - 100 mg/dL    BUN 9 8 - 23 MG/DL    Creatinine 0.70 0.6 - 1.0 MG/DL    Est, Glom Filt Rate >60 >60 ml/min/1.73m2    Calcium 7.9 (L) 8.3 - 10.4 MG/DL   CBC with Auto Differential    Collection Time: 02/07/23  3:15 AM   Result Value Ref Range    WBC 8.8 4.3 - 11.1 K/uL    RBC 4.12 4.05 - 5.2 M/uL    Hemoglobin 11.8 11.7 - 15.4 g/dL    Hematocrit 38.4 35.8 - 46.3 %    MCV 93.2 82 - 102 FL    MCH 28.6 26.1 - 32.9 PG    MCHC 30.7 (L) 31.4 - 35.0 g/dL    RDW 14.6 11.9 - 14.6 %    Platelets 716 246 - 378 K/uL    MPV 10.3 9.4 - 12.3 FL    nRBC 0.00 0.0 - 0.2 K/uL    Differential Type AUTOMATED      Seg Neutrophils 73 43 - 78 %    Lymphocytes 12 (L) 13 - 44 %    Monocytes 10 4.0 - 12.0 %    Eosinophils % 4 0.5 - 7.8 %    Basophils 1 0.0 - 2.0 %    Immature Granulocytes 1 0.0 - 5.0 %    Segs Absolute 6.4 1.7 - 8.2 K/UL    Absolute Lymph # 1.0 0.5 - 4.6 K/UL    Absolute Mono # 0.9 0.1 - 1.3 K/UL    Absolute Eos # 0.4 0.0 - 0.8 K/UL    Basophils Absolute 0.1 0.0 - 0.2 K/UL    Absolute Immature Granulocyte 0.1 0.0 - 0.5 K/UL   Hemoglobin A1C    Collection Time: 02/07/23  3:15 AM   Result Value Ref Range    Hemoglobin A1C 5.7 (H) 4.8 - 5.6 %    eAG 117 mg/dL   TSH    Collection Time: 02/07/23  3:15 AM   Result Value Ref Range    TSH, 3RD GENERATION 1.120 0.358 - 3.740 uIU/mL   POCT Glucose    Collection Time: 02/07/23  7:42 AM   Result Value Ref Range    POC Glucose 108 (H) 65 - 100 mg/dL    Performed by: Bre    POCT Glucose    Collection Time: 02/07/23 11:20 AM   Result Value Ref Range    POC Glucose 137 (H) 65 - 100 mg/dL    Performed by: AnkurCT        I have personally reviewed imaging studies:  Other Studies:  CT HEAD WO CONTRAST   Final Result   Old left-sided infarct. No CT evidence of acute intracranial   abnormality.       XR CHEST PORTABLE   Final Result   No acute findings in the chest             Current Meds:  Current Facility-Administered Medications   Medication Dose Route Frequency    sodium chloride flush 0.9 % injection 5-40 mL  5-40 mL IntraVENous 2 times per day    sodium chloride flush 0.9 % injection 5-40 mL  5-40 mL IntraVENous PRN    0.9 % sodium chloride infusion   IntraVENous PRN    potassium chloride (KLOR-CON M) extended release tablet 40 mEq  40 mEq Oral PRN    Or    potassium bicarb-citric acid (EFFER-K) effervescent tablet 40 mEq  40 mEq Oral PRN    Or    potassium chloride 10 mEq/100 mL IVPB (Peripheral Line)  10 mEq IntraVENous PRN    potassium chloride 10 mEq/100 mL IVPB (Peripheral Line)  10 mEq IntraVENous PRN    magnesium sulfate 2000 mg in 50 mL IVPB premix  2,000 mg IntraVENous PRN    ondansetron (ZOFRAN-ODT) disintegrating tablet 4 mg  4 mg Oral Q8H PRN    Or    ondansetron (ZOFRAN) injection 4 mg  4 mg IntraVENous Q6H PRN    polyethylene glycol (GLYCOLAX) packet 17 g  17 g Oral Daily PRN    bisacodyl (DULCOLAX) suppository 10 mg  10 mg Rectal Daily PRN    famotidine (PEPCID) tablet 10 mg  10 mg Oral Daily PRN    aluminum & magnesium hydroxide-simethicone (MAALOX) 200-200-20 MG/5ML suspension 30 mL  30 mL Oral Q6H PRN    acetaminophen (TYLENOL) tablet 650 mg  650 mg Oral Q6H PRN    Or    acetaminophen (TYLENOL) suppository 650 mg  650 mg Rectal Q6H PRN    enoxaparin (LOVENOX) injection 40 mg  40 mg SubCUTAneous Q24H    aspirin EC tablet 81 mg  81 mg Oral Daily    atorvastatin (LIPITOR) tablet 80 mg  80 mg Oral Nightly    clopidogrel (PLAVIX) tablet 75 mg  75 mg Oral Daily    DULoxetine (CYMBALTA) extended release capsule 60 mg  60 mg Oral Daily    lisinopril (PRINIVIL;ZESTRIL) tablet 5 mg  5 mg Oral Daily    metoprolol tartrate (LOPRESSOR) tablet 25 mg  25 mg Oral BID    traZODone (DESYREL) tablet 100 mg  100 mg Oral Nightly    vitamin D (CHOLECALCIFEROL) tablet 1,000 Units  1,000 Units Oral Daily    cefTRIAXone (ROCEPHIN) 1,000 mg in sodium chloride 0.9 % 50 mL IVPB (mini-bag)  1,000 mg IntraVENous Q24H    nicotine (NICODERM CQ) 14 MG/24HR 1 patch  1 patch TransDERmal Daily    albuterol (PROVENTIL) nebulizer solution 2.5 mg  2.5 mg Nebulization Q6H PRN    tuberculin injection 5 Units  5 Units IntraDERmal Once    insulin lispro (HUMALOG) injection vial 0-8 Units  0-8 Units SubCUTAneous TID WC    insulin lispro (HUMALOG) injection vial 0-4 Units  0-4 Units SubCUTAneous Nightly    glucose chewable tablet 16 g  4 tablet Oral PRN    dextrose bolus 10% 125 mL  125 mL IntraVENous PRN    Or    dextrose bolus 10% 250 mL  250 mL IntraVENous PRN    glucagon (rDNA) injection 1 mg  1 mg SubCUTAneous PRN    dextrose 10 % infusion   IntraVENous Continuous PRN       Signed:  Edi Madison DO    Part of this note may have been written by using a voice dictation software. The note has been proof read but may still contain some grammatical/other typographical errors.

## 2023-02-08 PROBLEM — E44.0 MODERATE PROTEIN-CALORIE MALNUTRITION (HCC): Chronic | Status: ACTIVE | Noted: 2023-02-08

## 2023-02-08 LAB
ALBUMIN SERPL-MCNC: 2.5 G/DL (ref 3.2–4.6)
ALBUMIN/GLOB SERPL: 0.7 (ref 0.4–1.6)
ALP SERPL-CCNC: 89 U/L (ref 50–136)
ALT SERPL-CCNC: 16 U/L (ref 12–65)
ANION GAP SERPL CALC-SCNC: 6 MMOL/L (ref 2–11)
AST SERPL-CCNC: 17 U/L (ref 15–37)
BACTERIA SPEC CULT: ABNORMAL
BACTERIA SPEC CULT: ABNORMAL
BASOPHILS # BLD: 0.1 K/UL (ref 0–0.2)
BASOPHILS NFR BLD: 1 % (ref 0–2)
BILIRUB SERPL-MCNC: 0.6 MG/DL (ref 0.2–1.1)
BUN SERPL-MCNC: 11 MG/DL (ref 8–23)
CALCIUM SERPL-MCNC: 8.8 MG/DL (ref 8.3–10.4)
CHLORIDE SERPL-SCNC: 106 MMOL/L (ref 101–110)
CO2 SERPL-SCNC: 26 MMOL/L (ref 21–32)
CREAT SERPL-MCNC: 0.6 MG/DL (ref 0.6–1)
DIFFERENTIAL METHOD BLD: ABNORMAL
EOSINOPHIL # BLD: 0.7 K/UL (ref 0–0.8)
EOSINOPHIL NFR BLD: 7 % (ref 0.5–7.8)
ERYTHROCYTE [DISTWIDTH] IN BLOOD BY AUTOMATED COUNT: 14.6 % (ref 11.9–14.6)
GLOBULIN SER CALC-MCNC: 3.6 G/DL (ref 2.8–4.5)
GLUCOSE BLD STRIP.AUTO-MCNC: 118 MG/DL (ref 65–100)
GLUCOSE BLD STRIP.AUTO-MCNC: 129 MG/DL (ref 65–100)
GLUCOSE BLD STRIP.AUTO-MCNC: 140 MG/DL (ref 65–100)
GLUCOSE BLD STRIP.AUTO-MCNC: 153 MG/DL (ref 65–100)
GLUCOSE SERPL-MCNC: 115 MG/DL (ref 65–100)
HCT VFR BLD AUTO: 36.2 % (ref 35.8–46.3)
HGB BLD-MCNC: 11.3 G/DL (ref 11.7–15.4)
IMM GRANULOCYTES # BLD AUTO: 0.1 K/UL (ref 0–0.5)
IMM GRANULOCYTES NFR BLD AUTO: 1 % (ref 0–5)
LYMPHOCYTES # BLD: 1.4 K/UL (ref 0.5–4.6)
LYMPHOCYTES NFR BLD: 15 % (ref 13–44)
MCH RBC QN AUTO: 28.6 PG (ref 26.1–32.9)
MCHC RBC AUTO-ENTMCNC: 31.2 G/DL (ref 31.4–35)
MCV RBC AUTO: 91.6 FL (ref 82–102)
MM INDURATION, POC: 0 MM (ref 0–5)
MONOCYTES # BLD: 0.9 K/UL (ref 0.1–1.3)
MONOCYTES NFR BLD: 10 % (ref 4–12)
NEUTS SEG # BLD: 6 K/UL (ref 1.7–8.2)
NEUTS SEG NFR BLD: 66 % (ref 43–78)
NRBC # BLD: 0 K/UL (ref 0–0.2)
PLATELET # BLD AUTO: 369 K/UL (ref 150–450)
PMV BLD AUTO: 10.2 FL (ref 9.4–12.3)
POTASSIUM SERPL-SCNC: 4.2 MMOL/L (ref 3.5–5.1)
PPD, POC: NEGATIVE
PROT SERPL-MCNC: 6.1 G/DL (ref 6.3–8.2)
RBC # BLD AUTO: 3.95 M/UL (ref 4.05–5.2)
SERVICE CMNT-IMP: ABNORMAL
SODIUM SERPL-SCNC: 138 MMOL/L (ref 133–143)
WBC # BLD AUTO: 9.1 K/UL (ref 4.3–11.1)

## 2023-02-08 PROCEDURE — 2580000003 HC RX 258: Performed by: INTERNAL MEDICINE

## 2023-02-08 PROCEDURE — G0378 HOSPITAL OBSERVATION PER HR: HCPCS

## 2023-02-08 PROCEDURE — 36415 COLL VENOUS BLD VENIPUNCTURE: CPT

## 2023-02-08 PROCEDURE — 96372 THER/PROPH/DIAG INJ SC/IM: CPT

## 2023-02-08 PROCEDURE — 6360000002 HC RX W HCPCS: Performed by: INTERNAL MEDICINE

## 2023-02-08 PROCEDURE — 6370000000 HC RX 637 (ALT 250 FOR IP): Performed by: INTERNAL MEDICINE

## 2023-02-08 PROCEDURE — 80053 COMPREHEN METABOLIC PANEL: CPT

## 2023-02-08 PROCEDURE — 96376 TX/PRO/DX INJ SAME DRUG ADON: CPT

## 2023-02-08 PROCEDURE — 82962 GLUCOSE BLOOD TEST: CPT

## 2023-02-08 PROCEDURE — 97530 THERAPEUTIC ACTIVITIES: CPT

## 2023-02-08 PROCEDURE — 85025 COMPLETE CBC W/AUTO DIFF WBC: CPT

## 2023-02-08 PROCEDURE — 6370000000 HC RX 637 (ALT 250 FOR IP): Performed by: HOSPITALIST

## 2023-02-08 RX ORDER — TRAMADOL HYDROCHLORIDE 50 MG/1
50 TABLET ORAL EVERY 6 HOURS PRN
Status: DISCONTINUED | OUTPATIENT
Start: 2023-02-08 | End: 2023-02-09 | Stop reason: HOSPADM

## 2023-02-08 RX ADMIN — SODIUM CHLORIDE, PRESERVATIVE FREE 10 ML: 5 INJECTION INTRAVENOUS at 10:03

## 2023-02-08 RX ADMIN — ATORVASTATIN CALCIUM 80 MG: 40 TABLET, FILM COATED ORAL at 23:18

## 2023-02-08 RX ADMIN — DULOXETINE HYDROCHLORIDE 60 MG: 30 CAPSULE, DELAYED RELEASE ORAL at 09:53

## 2023-02-08 RX ADMIN — CEFTRIAXONE 1000 MG: 1 INJECTION, POWDER, FOR SOLUTION INTRAMUSCULAR; INTRAVENOUS at 12:18

## 2023-02-08 RX ADMIN — SODIUM CHLORIDE, PRESERVATIVE FREE 10 ML: 5 INJECTION INTRAVENOUS at 21:44

## 2023-02-08 RX ADMIN — METOPROLOL TARTRATE 25 MG: 25 TABLET, FILM COATED ORAL at 21:45

## 2023-02-08 RX ADMIN — VITAMIN D, TAB 1000IU (100/BT) 1000 UNITS: 25 TAB at 09:53

## 2023-02-08 RX ADMIN — ACETAMINOPHEN 650 MG: 325 TABLET ORAL at 12:15

## 2023-02-08 RX ADMIN — METOPROLOL TARTRATE 25 MG: 25 TABLET, FILM COATED ORAL at 09:53

## 2023-02-08 RX ADMIN — CLOPIDOGREL BISULFATE 75 MG: 75 TABLET ORAL at 09:53

## 2023-02-08 RX ADMIN — ASPIRIN 81 MG: 81 TABLET ORAL at 09:53

## 2023-02-08 RX ADMIN — TRAMADOL HYDROCHLORIDE 50 MG: 50 TABLET ORAL at 23:18

## 2023-02-08 RX ADMIN — LISINOPRIL 5 MG: 5 TABLET ORAL at 09:53

## 2023-02-08 RX ADMIN — TRAZODONE HYDROCHLORIDE 100 MG: 50 TABLET ORAL at 21:53

## 2023-02-08 RX ADMIN — ENOXAPARIN SODIUM 40 MG: 100 INJECTION SUBCUTANEOUS at 13:23

## 2023-02-08 ASSESSMENT — PAIN DESCRIPTION - DESCRIPTORS: DESCRIPTORS: ACHING

## 2023-02-08 ASSESSMENT — PAIN SCALES - GENERAL
PAINLEVEL_OUTOF10: 0
PAINLEVEL_OUTOF10: 7
PAINLEVEL_OUTOF10: 0
PAINLEVEL_OUTOF10: 0
PAINLEVEL_OUTOF10: 5
PAINLEVEL_OUTOF10: 0

## 2023-02-08 ASSESSMENT — PAIN DESCRIPTION - LOCATION
LOCATION: NECK
LOCATION: BACK

## 2023-02-08 ASSESSMENT — PAIN DESCRIPTION - FREQUENCY: FREQUENCY: INTERMITTENT

## 2023-02-08 ASSESSMENT — PAIN DESCRIPTION - ORIENTATION: ORIENTATION: LOWER

## 2023-02-08 ASSESSMENT — PAIN - FUNCTIONAL ASSESSMENT: PAIN_FUNCTIONAL_ASSESSMENT: PREVENTS OR INTERFERES WITH ALL ACTIVE AND SOME PASSIVE ACTIVITIES

## 2023-02-08 ASSESSMENT — PAIN DESCRIPTION - ONSET: ONSET: PROGRESSIVE

## 2023-02-08 ASSESSMENT — PAIN DESCRIPTION - PAIN TYPE: TYPE: ACUTE PAIN

## 2023-02-08 NOTE — PROGRESS NOTES
Hospitalist Progress Note   Admit Date:  2023  9:20 AM   Name:  Idalia Hare   Age:  81 y.o. Sex:  female  :  1950   MRN:  [id number]   Room:  [facility]/    Presenting Complaint: Incontinence and Fatigue     Reason(s) for Admission: Generalized weakness [R53.1]  Acute UTI [N39.0]  Failure to thrive in adult [R62.7]     Hospital Course:   Ms. Kay Lo is a 80 y/o WF with a h/o HepC, MDD/anxiety, CAD, DM2, CVA with residual R sided weakness, HTN, osteoporosis who was admitted to our service on  with adult failure to thrive. Son lives with her and family friend, Lilia Hidalgo, helps out when he is at work. Urine culture growing E. Coli. Now receptive to placement. Night of  had an episode of vomiting and hypoxia, has improved off abx. Subjective & 24hr Events (23):   Awake, on 2.5L-3L O2. Feels well. No cough, congestion or SOB. Appetite is so so. Assessment & Plan:   # Adult failure to thrive   - Dietitian eval. Con't current plan of care. - Awaiting placement. # Aspiration pneumonitis // acute hypoxemic respiratory failure   - Vomiting episode with desat on night of  to 70s-low 80s, was initially on 8L and weaned to 4L quickly. No issues with oral intake, choking or coughing, decreased O2 to 2L today, . # Acute uncomplicated cystitis   - E. Coli in culture, ceftriaxone x3 days, EOT today . # Cad   - ASA, statin, Plavix    # HTN   - ACEi, BB    # MDD // anxiety   - Cymbalta    # DM2   - SSI    Anticipated discharge needs: pending placement. Diet:  ADULT DIET; Regular; 3 carb choices (45 gm/meal);  Low Fat/Low Chol/High Fiber/MIKE  ADULT ORAL NUTRITION SUPPLEMENT; Breakfast, Lunch, Dinner; Diabetic Oral Supplement  DVT PPx: Lovenox  Code status: Full Code    Hospital Problems:  Principal Problem:    Failure to thrive in adult  Active Problems:    UTI (urinary tract infection)    Moderate protein-calorie malnutrition (HCC)    Essential hypertension    Chronic hepatitis C without hepatic coma (HCC)    Anxiety and depression    Coronary artery disease involving native coronary artery of native heart without angina pectoris    Type 2 diabetes mellitus without complication, without long-term current use of insulin (HCC)    Panlobular emphysema (HCC)  Resolved Problems:    * No resolved hospital problems. *      Objective:   Patient Vitals for the past 24 hrs:   Temp Pulse Resp BP SpO2   02/08/23 0717 97.5 °F (36.4 °C) 62 16 (!) 154/67 94 %   02/08/23 0335 97.5 °F (36.4 °C) 56 17 (!) 152/69 95 %   02/07/23 2346 97.5 °F (36.4 °C) 59 17 122/78 95 %   02/07/23 1956 97.3 °F (36.3 °C) 73 18 (!) 168/82 96 %   02/07/23 1518 97.3 °F (36.3 °C) 66 17 (!) 166/89 92 %   02/07/23 1138 97.2 °F (36.2 °C) -- 17 128/63 99 %       Oxygen Therapy  SpO2: 94 %  Pulse Oximeter Device Mode: Intermittent  O2 Device: Nasal cannula  Skin Assessment: Clean, dry, & intact  Skin Protection for O2 Device: No  O2 Flow Rate (L/min): 3 L/min    Estimated body mass index is 22.57 kg/m² as calculated from the following:    Height as of this encounter: 5' 7\" (1.702 m). Weight as of this encounter: 144 lb 1.6 oz (65.4 kg). Intake/Output Summary (Last 24 hours) at 2/8/2023 0817  Last data filed at 2/8/2023 0025  Gross per 24 hour   Intake 360 ml   Output 900 ml   Net -540 ml         Physical Exam:   Blood pressure (!) 154/67, pulse 62, temperature 97.5 °F (36.4 °C), temperature source Oral, resp. rate 16, height 5' 7\" (1.702 m), weight 144 lb 1.6 oz (65.4 kg), SpO2 94 %. General:    Thin, awake, in bed, pleasant. Appears chronically ill. Head:  Normocephalic, atraumatic  Eyes:  Sclerae appear normal. Pupils equally round. ENT:  Nares appear normal. Slightly dry oral mucosa. Poor dentition. Neck:  No restricted ROM. Trachea midline   CV:   RRR. No m/r/g. No jugular venous distension. Lungs:   CTAB. No wheezing, rhonchi, or rales. Symmetric expansion. Abdomen:   Soft, nontender, nondistended.   Extremities: No cyanosis or clubbing. Trace LE edema. Skin:     No rashes and normal coloration. Warm and dry. Neuro:  CN II-XII grossly intact. A/O x.3  Psych:  Normal mood and affect.       I have personally reviewed labs and tests:  Recent Labs:  Recent Results (from the past 48 hour(s))   EKG 12 Lead    Collection Time: 02/06/23  9:36 AM   Result Value Ref Range    Ventricular Rate 87 BPM    Atrial Rate 38 BPM    P-R Interval 136 ms    QRS Duration 136 ms    Q-T Interval 414 ms    QTc Calculation (Bazett) 393 ms    P Axis 58 degrees    R Axis 4 degrees    T Axis -55 degrees    Diagnosis Sinus rhythm    CMP    Collection Time: 02/06/23 10:00 AM   Result Value Ref Range    Sodium 135 133 - 143 mmol/L    Potassium 4.4 3.5 - 5.1 mmol/L    Chloride 106 101 - 110 mmol/L    CO2 21 21 - 32 mmol/L    Anion Gap 8 2 - 11 mmol/L    Glucose 98 65 - 100 mg/dL    BUN 13 8 - 23 MG/DL    Creatinine 0.80 0.6 - 1.0 MG/DL    Est, Glom Filt Rate >60 >60 ml/min/1.73m2    Calcium 8.8 8.3 - 10.4 MG/DL    Total Bilirubin 0.7 0.2 - 1.1 MG/DL    ALT 20 12 - 65 U/L    AST 17 15 - 37 U/L    Alk Phosphatase 76 50 - 136 U/L    Total Protein 7.2 6.3 - 8.2 g/dL    Albumin 2.8 (L) 3.2 - 4.6 g/dL    Globulin 4.4 2.8 - 4.5 g/dL    Albumin/Globulin Ratio 0.6 0.4 - 1.6     Magnesium    Collection Time: 02/06/23 10:00 AM   Result Value Ref Range    Magnesium 2.0 1.8 - 2.4 mg/dL   Procalcitonin    Collection Time: 02/06/23 10:00 AM   Result Value Ref Range    Procalcitonin <0.05 0.00 - 0.49 ng/mL   Urinalysis w rflx microscopic    Collection Time: 02/06/23 11:06 AM   Result Value Ref Range    Color, UA YELLOW/STRAW      Appearance CLOUDY      Specific Gravity, UA 1.007 1.001 - 1.023      pH, Urine 5.5 5.0 - 9.0      Protein, UA TRACE (A) NEG mg/dL    Glucose, UA Negative mg/dL    Ketones, Urine Negative NEG mg/dL    Bilirubin Urine Negative NEG      Blood, Urine TRACE (A) NEG      Urobilinogen, Urine 1.0 0.2 - 1.0 EU/dL    Nitrite, Urine Positive (A) NEG Leukocyte Esterase, Urine LARGE (A) NEG      WBC, UA  0 /hpf    Epithelial Cells UA 10-20 0 /hpf    BACTERIA, URINE 4+ (H) 0 /hpf    Other observations RESULTS VERIFIED MANUALLY     CBC with Auto Differential    Collection Time: 02/06/23 11:06 AM   Result Value Ref Range    WBC 12.1 (H) 4.3 - 11.1 K/uL    RBC 5.15 4.05 - 5.2 M/uL    Hemoglobin 14.8 11.7 - 15.4 g/dL    Hematocrit 47.8 (H) 35.8 - 46.3 %    MCV 92.8 82 - 102 FL    MCH 28.7 26.1 - 32.9 PG    MCHC 31.0 (L) 31.4 - 35.0 g/dL    RDW 14.7 (H) 11.9 - 14.6 %    Platelets 738 006 - 194 K/uL    MPV 9.7 9.4 - 12.3 FL    nRBC 0.00 0.0 - 0.2 K/uL    Differential Type AUTOMATED      Seg Neutrophils 68 43 - 78 %    Lymphocytes 15 13 - 44 %    Monocytes 9 4.0 - 12.0 %    Eosinophils % 6 0.5 - 7.8 %    Basophils 1 0.0 - 2.0 %    Immature Granulocytes 1 0.0 - 5.0 %    Segs Absolute 8.3 (H) 1.7 - 8.2 K/UL    Absolute Lymph # 1.8 0.5 - 4.6 K/UL    Absolute Mono # 1.1 0.1 - 1.3 K/UL    Absolute Eos # 0.7 0.0 - 0.8 K/UL    Basophils Absolute 0.1 0.0 - 0.2 K/UL    Absolute Immature Granulocyte 0.2 0.0 - 0.5 K/UL   Blood Culture 1    Collection Time: 02/06/23 11:43 AM    Specimen: Blood   Result Value Ref Range    Special Requests RIGHT FOREARM      Culture NO GROWTH AFTER 19 HOURS     Culture, Urine    Collection Time: 02/06/23 11:45 AM    Specimen: Urine, clean catch   Result Value Ref Range    Special Requests NO SPECIAL REQUESTS      Culture >100,000 COLONIES/mL Escherichia coli (A)      Culture        10,000 to 50,000 COLONIES/mL MIXED SKIN TONE ISOLATED       Susceptibility    Escherichia coli - BACTERIAL SUSCEPTIBILITY PANEL PAUL     trimethoprim-sulfamethoxazole <=0.5/9.5 Sensitive ug/mL     nitrofurantoin <=16 Sensitive ug/mL     ampicillin <=4 Sensitive ug/mL     gentamicin <=2 Sensitive ug/mL     tobramycin <=2 Sensitive ug/mL     ampicillin-sulbactam 2/1 Sensitive ug/mL     ceFAZolin 2 Sensitive ug/mL     cefTRIAXone <=1 Sensitive ug/mL     cefepime <=1 Sensitive ug/mL     piperacillin-tazobactam <=2/4 Sensitive ug/mL     aztreonam <=2 Sensitive ug/mL   Blood Culture 2    Collection Time: 02/06/23 12:52 PM    Specimen: Blood   Result Value Ref Range    Special Requests LEFT  Antecubital        Culture NO GROWTH AFTER 18 HOURS     Lactate, Sepsis    Collection Time: 02/06/23 12:52 PM   Result Value Ref Range    Lactic Acid, Sepsis 2.6 (H) 0.4 - 2.0 MMOL/L   Lactate, Sepsis    Collection Time: 02/06/23  4:04 PM   Result Value Ref Range    Lactic Acid, Sepsis 1.9 0.4 - 2.0 MMOL/L   Culture, Blood 1    Collection Time: 02/06/23  4:04 PM    Specimen: Blood   Result Value Ref Range    Special Requests LEFT  Antecubital        Culture NO GROWTH AFTER 15 HOURS     POCT Glucose    Collection Time: 02/06/23  4:16 PM   Result Value Ref Range    POC Glucose 141 (H) 65 - 100 mg/dL    Performed by: Cynthia Lara    POCT Glucose    Collection Time: 02/06/23  8:13 PM   Result Value Ref Range    POC Glucose 118 (H) 65 - 100 mg/dL    Performed by: MillerSiomaraxisPCT    EKG 12 Lead    Collection Time: 02/06/23  9:17 PM   Result Value Ref Range    Ventricular Rate 96 BPM    Atrial Rate 96 BPM    P-R Interval 132 ms    QRS Duration 132 ms    Q-T Interval 394 ms    QTc Calculation (Bazett) 497 ms    P Axis 58 degrees    R Axis 4 degrees    T Axis -28 degrees    Diagnosis       Sinus rhythm with frequent Premature ventricular complexes   Basic Metabolic Panel w/ Reflex to MG    Collection Time: 02/07/23  3:15 AM   Result Value Ref Range    Sodium 139 133 - 143 mmol/L    Potassium 3.9 3.5 - 5.1 mmol/L    Chloride 108 101 - 110 mmol/L    CO2 26 21 - 32 mmol/L    Anion Gap 5 2 - 11 mmol/L    Glucose 122 (H) 65 - 100 mg/dL    BUN 9 8 - 23 MG/DL    Creatinine 0.70 0.6 - 1.0 MG/DL    Est, Glom Filt Rate >60 >60 ml/min/1.73m2    Calcium 7.9 (L) 8.3 - 10.4 MG/DL   CBC with Auto Differential    Collection Time: 02/07/23  3:15 AM   Result Value Ref Range    WBC 8.8 4.3 - 11.1 K/uL    RBC 4.12 4.05 - 5.2 M/uL    Hemoglobin 11.8 11.7 - 15.4 g/dL    Hematocrit 38.4 35.8 - 46.3 %    MCV 93.2 82 - 102 FL    MCH 28.6 26.1 - 32.9 PG    MCHC 30.7 (L) 31.4 - 35.0 g/dL    RDW 14.6 11.9 - 14.6 %    Platelets 624 723 - 460 K/uL    MPV 10.3 9.4 - 12.3 FL    nRBC 0.00 0.0 - 0.2 K/uL    Differential Type AUTOMATED      Seg Neutrophils 73 43 - 78 %    Lymphocytes 12 (L) 13 - 44 %    Monocytes 10 4.0 - 12.0 %    Eosinophils % 4 0.5 - 7.8 %    Basophils 1 0.0 - 2.0 %    Immature Granulocytes 1 0.0 - 5.0 %    Segs Absolute 6.4 1.7 - 8.2 K/UL    Absolute Lymph # 1.0 0.5 - 4.6 K/UL    Absolute Mono # 0.9 0.1 - 1.3 K/UL    Absolute Eos # 0.4 0.0 - 0.8 K/UL    Basophils Absolute 0.1 0.0 - 0.2 K/UL    Absolute Immature Granulocyte 0.1 0.0 - 0.5 K/UL   Hemoglobin A1C    Collection Time: 02/07/23  3:15 AM   Result Value Ref Range    Hemoglobin A1C 5.7 (H) 4.8 - 5.6 %    eAG 117 mg/dL   TSH    Collection Time: 02/07/23  3:15 AM   Result Value Ref Range    TSH, 3RD GENERATION 1.120 0.358 - 3.740 uIU/mL   POCT Glucose    Collection Time: 02/07/23  7:42 AM   Result Value Ref Range    POC Glucose 108 (H) 65 - 100 mg/dL    Performed by: Bre    POCT Glucose    Collection Time: 02/07/23 11:20 AM   Result Value Ref Range    POC Glucose 137 (H) 65 - 100 mg/dL    Performed by: Bre    PLEASE READ & DOCUMENT PPD TEST IN 24 HRS    Collection Time: 02/07/23  3:44 PM   Result Value Ref Range    PPD, (POC) Negative Negative    mm Induration 0 0 - 5 mm   POCT Glucose    Collection Time: 02/07/23  5:09 PM   Result Value Ref Range    POC Glucose 274 (H) 65 - 100 mg/dL    Performed by: Silva    POCT Glucose    Collection Time: 02/07/23  8:23 PM   Result Value Ref Range    POC Glucose 169 (H) 65 - 100 mg/dL    Performed by: Harry    Comprehensive Metabolic Panel    Collection Time: 02/08/23  3:13 AM   Result Value Ref Range    Sodium 138 133 - 143 mmol/L    Potassium 4.2 3.5 - 5.1 mmol/L Chloride 106 101 - 110 mmol/L    CO2 26 21 - 32 mmol/L    Anion Gap 6 2 - 11 mmol/L    Glucose 115 (H) 65 - 100 mg/dL    BUN 11 8 - 23 MG/DL    Creatinine 0.60 0.6 - 1.0 MG/DL    Est, Glom Filt Rate >60 >60 ml/min/1.73m2    Calcium 8.8 8.3 - 10.4 MG/DL    Total Bilirubin 0.6 0.2 - 1.1 MG/DL    ALT 16 12 - 65 U/L    AST 17 15 - 37 U/L    Alk Phosphatase 89 50 - 136 U/L    Total Protein 6.1 (L) 6.3 - 8.2 g/dL    Albumin 2.5 (L) 3.2 - 4.6 g/dL    Globulin 3.6 2.8 - 4.5 g/dL    Albumin/Globulin Ratio 0.7 0.4 - 1.6     CBC with Auto Differential    Collection Time: 02/08/23  3:13 AM   Result Value Ref Range    WBC 9.1 4.3 - 11.1 K/uL    RBC 3.95 (L) 4.05 - 5.2 M/uL    Hemoglobin 11.3 (L) 11.7 - 15.4 g/dL    Hematocrit 36.2 35.8 - 46.3 %    MCV 91.6 82 - 102 FL    MCH 28.6 26.1 - 32.9 PG    MCHC 31.2 (L) 31.4 - 35.0 g/dL    RDW 14.6 11.9 - 14.6 %    Platelets 790 077 - 842 K/uL    MPV 10.2 9.4 - 12.3 FL    nRBC 0.00 0.0 - 0.2 K/uL    Differential Type AUTOMATED      Seg Neutrophils 66 43 - 78 %    Lymphocytes 15 13 - 44 %    Monocytes 10 4.0 - 12.0 %    Eosinophils % 7 0.5 - 7.8 %    Basophils 1 0.0 - 2.0 %    Immature Granulocytes 1 0.0 - 5.0 %    Segs Absolute 6.0 1.7 - 8.2 K/UL    Absolute Lymph # 1.4 0.5 - 4.6 K/UL    Absolute Mono # 0.9 0.1 - 1.3 K/UL    Absolute Eos # 0.7 0.0 - 0.8 K/UL    Basophils Absolute 0.1 0.0 - 0.2 K/UL    Absolute Immature Granulocyte 0.1 0.0 - 0.5 K/UL   POCT Glucose    Collection Time: 02/08/23  7:16 AM   Result Value Ref Range    POC Glucose 140 (H) 65 - 100 mg/dL    Performed by: Bre IQBAL have personally reviewed imaging studies:  Other Studies:  CT HEAD WO CONTRAST   Final Result   Old left-sided infarct. No CT evidence of acute intracranial   abnormality.       XR CHEST PORTABLE   Final Result   No acute findings in the chest             Current Meds:  Current Facility-Administered Medications   Medication Dose Route Frequency    sodium chloride flush 0.9 % injection 5-40 mL  5-40 mL IntraVENous 2 times per day    sodium chloride flush 0.9 % injection 5-40 mL  5-40 mL IntraVENous PRN    0.9 % sodium chloride infusion   IntraVENous PRN    potassium chloride (KLOR-CON M) extended release tablet 40 mEq  40 mEq Oral PRN    Or    potassium bicarb-citric acid (EFFER-K) effervescent tablet 40 mEq  40 mEq Oral PRN    Or    potassium chloride 10 mEq/100 mL IVPB (Peripheral Line)  10 mEq IntraVENous PRN    potassium chloride 10 mEq/100 mL IVPB (Peripheral Line)  10 mEq IntraVENous PRN    magnesium sulfate 2000 mg in 50 mL IVPB premix  2,000 mg IntraVENous PRN    ondansetron (ZOFRAN-ODT) disintegrating tablet 4 mg  4 mg Oral Q8H PRN    Or    ondansetron (ZOFRAN) injection 4 mg  4 mg IntraVENous Q6H PRN    polyethylene glycol (GLYCOLAX) packet 17 g  17 g Oral Daily PRN    bisacodyl (DULCOLAX) suppository 10 mg  10 mg Rectal Daily PRN    famotidine (PEPCID) tablet 10 mg  10 mg Oral Daily PRN    aluminum & magnesium hydroxide-simethicone (MAALOX) 200-200-20 MG/5ML suspension 30 mL  30 mL Oral Q6H PRN    acetaminophen (TYLENOL) tablet 650 mg  650 mg Oral Q6H PRN    Or    acetaminophen (TYLENOL) suppository 650 mg  650 mg Rectal Q6H PRN    enoxaparin (LOVENOX) injection 40 mg  40 mg SubCUTAneous Q24H    aspirin EC tablet 81 mg  81 mg Oral Daily    atorvastatin (LIPITOR) tablet 80 mg  80 mg Oral Nightly    clopidogrel (PLAVIX) tablet 75 mg  75 mg Oral Daily    DULoxetine (CYMBALTA) extended release capsule 60 mg  60 mg Oral Daily    lisinopril (PRINIVIL;ZESTRIL) tablet 5 mg  5 mg Oral Daily    metoprolol tartrate (LOPRESSOR) tablet 25 mg  25 mg Oral BID    traZODone (DESYREL) tablet 100 mg  100 mg Oral Nightly    vitamin D (CHOLECALCIFEROL) tablet 1,000 Units  1,000 Units Oral Daily    cefTRIAXone (ROCEPHIN) 1,000 mg in sodium chloride 0.9 % 50 mL IVPB (mini-bag)  1,000 mg IntraVENous Q24H    nicotine (NICODERM CQ) 14 MG/24HR 1 patch  1 patch TransDERmal Daily    albuterol (PROVENTIL) nebulizer solution 2.5 mg  2.5 mg Nebulization Q6H PRN    insulin lispro (HUMALOG) injection vial 0-8 Units  0-8 Units SubCUTAneous TID WC    insulin lispro (HUMALOG) injection vial 0-4 Units  0-4 Units SubCUTAneous Nightly    glucose chewable tablet 16 g  4 tablet Oral PRN    dextrose bolus 10% 125 mL  125 mL IntraVENous PRN    Or    dextrose bolus 10% 250 mL  250 mL IntraVENous PRN    glucagon (rDNA) injection 1 mg  1 mg SubCUTAneous PRN    dextrose 10 % infusion   IntraVENous Continuous PRN       Signed:  Tasia Kim MD    Part of this note may have been written by using a voice dictation software. The note has been proof read but may still contain some grammatical/other typographical errors.

## 2023-02-08 NOTE — PROGRESS NOTES
ACUTE PHYSICAL THERAPY GOALS:   (Developed with and agreed upon by patient and/or caregiver. )  LTG:  (1.)Ms. Annamaria Ayala will move from supine to sit and sit to supine , scoot up and down, and roll side to side in bed with MINIMAL ASSIST within 7 treatment day(s). (2.)Ms. Annamaria Ayala will transfer from bed to chair and chair to bed with MODERATE ASSIST using the least restrictive device within 7 treatment day(s). (3.)Ms. Annamaria Ayala will perform seated exercises per HEP for 15+ minutes to improve strength and mobility within 7 treatment day(s). PHYSICAL THERAPY: Daily Note AM   (Link to Caseload Tracking: PT Visit Days : 2  Time In/Out PT Charge Capture  Rehab Caseload Tracker  Orders    Olu Lugo is a 81 y.o. female   PRIMARY DIAGNOSIS: Failure to thrive in adult  Generalized weakness [R53.1]  Acute UTI [N39.0]  Failure to thrive in adult [R62.7]       Observation: Payor: Murphy Sullivan / Plan: Moo Rodrigez / Product Type: *No Product type* /     ASSESSMENT:     REHAB RECOMMENDATIONS:   Recommendation to date pending progress:  Setting:  Short-term Rehab    Equipment:    To Be Determined     ASSESSMENT:  Ms. Annamaria Ayala presents supine, agrees to therapy and likes to joke around. She sits to L side of bed with max assist x 2. Once sitting she holds to bed rail with her L hand and is able to maintain her sitting balance for short periods of time. As she fatigues, she starts leaning posteriorly. She leans heavily at times. Worked on sitting balance, trunk flexion, and posture. She returned supine and was left positioned to comfort. Will continue with POC. .     SUBJECTIVE:   Ms. Annamaria Ayala states, \"I get up to the left side. \"     Social/Functional Lives With: Son, Other (comment)  Type of Home: House  Home Layout: One level  Home Access: Ramped entrance  [address] Help From: Family  ADL Assistance: Needs assistance  Homemaking Assistance: Needs assistance  Homemaking Responsibilities: No  Ambulation Assistance: Needs assistance  Transfer Assistance: Needs assistance  Active : No  OBJECTIVE:     PAIN: VITALS / O2: PRECAUTION / Geryl Bennett / DRAINS:   Pre Treatment: 0         Post Treatment: 0 Vitals        Oxygen    IV and Purewick    RESTRICTIONS/PRECAUTIONS:        MOBILITY: I Mod I S SBA CGA Min Mod Max Total  NT x2 Comments:   Bed Mobility    Rolling [] [] [] [] [] [] [] [x] [] [] []    Supine to Sit [] [] [] [] [] [] [] [x] [] [] [x]    Scooting [] [] [] [] [] [] [] [x] [] [] [x]    Sit to Supine [] [] [] [] [] [] [] [x] [] [] [x]    Transfers    Sit to Stand [] [] [] [] [] [] [] [] [] [] []    Bed to Chair [] [] [] [] [] [] [] [] [] [] []    Stand to Sit [] [] [] [] [] [] [] [] [] [] []     [] [] [] [] [] [] [] [] [] [] []    I=Independent, Mod I=Modified Independent, S=Supervision, SBA=Standby Assistance, CGA=Contact Guard Assistance,   Min=Minimal Assistance, Mod=Moderate Assistance, Max=Maximal Assistance, Total=Total Assistance, NT=Not Tested    BALANCE: Good Fair+ Fair Fair- Poor NT Comments   Sitting Static [] [] [] [] [x] []    Sitting Dynamic [] [] [] [] [x] []              Standing Static [] [] [] [] [] []    Standing Dynamic [] [] [] [] [] []      GAIT: I Mod I S SBA CGA Min Mod Max Total  NT x2 Comments:   Level of Assistance [] [] [] [] [] [] [] [] [] [] []    Distance   feet    DME N/A    Gait Quality N/A    Weightbearing Status      Stairs      I=Independent, Mod I=Modified Independent, S=Supervision, SBA=Standby Assistance, CGA=Contact Guard Assistance,   Min=Minimal Assistance, Mod=Moderate Assistance, Max=Maximal Assistance, Total=Total Assistance, NT=Not Tested    PLAN:   FREQUENCY AND DURATION: 3 times/week for duration of hospital stay or until stated goals are met, whichever comes first.    TREATMENT:   TREATMENT:   Therapeutic Activity (25 Minutes):  Therapeutic activity included Rolling, Supine to Sit, Sit to Supine, Scooting, and Sitting balance  to improve functional Activity tolerance, Balance, Coordination, Mobility, and Strength.     TREATMENT GRID:  N/A    AFTER TREATMENT PRECAUTIONS: Alarm Activated, Bed, Bed/Chair Locked, Call light within reach, Needs within reach, RN notified, and Side rails x3    INTERDISCIPLINARY COLLABORATION:  RN/ PCT, PT/ PTA, and Rehab Attendant    EDUCATION:      TIME IN/OUT:  Time In: 1118  Time Out: 1143  Minutes: 25    KADEN KESSLER PTA

## 2023-02-09 ENCOUNTER — APPOINTMENT (OUTPATIENT)
Dept: GENERAL RADIOLOGY | Age: 73
DRG: 689 | End: 2023-02-09
Payer: MEDICARE

## 2023-02-09 VITALS
RESPIRATION RATE: 18 BRPM | HEIGHT: 67 IN | DIASTOLIC BLOOD PRESSURE: 62 MMHG | HEART RATE: 89 BPM | BODY MASS INDEX: 22.62 KG/M2 | TEMPERATURE: 98.1 F | WEIGHT: 144.1 LBS | SYSTOLIC BLOOD PRESSURE: 118 MMHG | OXYGEN SATURATION: 93 %

## 2023-02-09 PROBLEM — N39.0 UTI (URINARY TRACT INFECTION): Status: RESOLVED | Noted: 2023-02-06 | Resolved: 2023-02-09

## 2023-02-09 LAB
GLUCOSE BLD STRIP.AUTO-MCNC: 112 MG/DL (ref 65–100)
GLUCOSE BLD STRIP.AUTO-MCNC: 120 MG/DL (ref 65–100)
SARS-COV-2 RDRP RESP QL NAA+PROBE: NOT DETECTED
SERVICE CMNT-IMP: ABNORMAL
SERVICE CMNT-IMP: ABNORMAL
SOURCE: NORMAL

## 2023-02-09 PROCEDURE — 2580000003 HC RX 258: Performed by: INTERNAL MEDICINE

## 2023-02-09 PROCEDURE — G0378 HOSPITAL OBSERVATION PER HR: HCPCS

## 2023-02-09 PROCEDURE — 82962 GLUCOSE BLOOD TEST: CPT

## 2023-02-09 PROCEDURE — 1100000000 HC RM PRIVATE

## 2023-02-09 PROCEDURE — 6370000000 HC RX 637 (ALT 250 FOR IP): Performed by: INTERNAL MEDICINE

## 2023-02-09 PROCEDURE — 87635 SARS-COV-2 COVID-19 AMP PRB: CPT

## 2023-02-09 PROCEDURE — 71045 X-RAY EXAM CHEST 1 VIEW: CPT

## 2023-02-09 PROCEDURE — 6360000002 HC RX W HCPCS: Performed by: INTERNAL MEDICINE

## 2023-02-09 PROCEDURE — 6370000000 HC RX 637 (ALT 250 FOR IP): Performed by: HOSPITALIST

## 2023-02-09 RX ORDER — TRAZODONE HYDROCHLORIDE 100 MG/1
100 TABLET ORAL NIGHTLY
Qty: 5 TABLET | Refills: 0 | Status: SHIPPED | OUTPATIENT
Start: 2023-02-09 | End: 2023-02-14

## 2023-02-09 RX ADMIN — TRAMADOL HYDROCHLORIDE 50 MG: 50 TABLET ORAL at 12:34

## 2023-02-09 RX ADMIN — METOPROLOL TARTRATE 25 MG: 25 TABLET, FILM COATED ORAL at 08:05

## 2023-02-09 RX ADMIN — ENOXAPARIN SODIUM 40 MG: 100 INJECTION SUBCUTANEOUS at 12:34

## 2023-02-09 RX ADMIN — VITAMIN D, TAB 1000IU (100/BT) 1000 UNITS: 25 TAB at 08:05

## 2023-02-09 RX ADMIN — ASPIRIN 81 MG: 81 TABLET ORAL at 08:05

## 2023-02-09 RX ADMIN — SODIUM CHLORIDE, PRESERVATIVE FREE 10 ML: 5 INJECTION INTRAVENOUS at 08:05

## 2023-02-09 RX ADMIN — DULOXETINE HYDROCHLORIDE 60 MG: 30 CAPSULE, DELAYED RELEASE ORAL at 08:05

## 2023-02-09 RX ADMIN — LISINOPRIL 5 MG: 5 TABLET ORAL at 08:05

## 2023-02-09 RX ADMIN — CLOPIDOGREL BISULFATE 75 MG: 75 TABLET ORAL at 08:05

## 2023-02-09 ASSESSMENT — PAIN SCALES - GENERAL
PAINLEVEL_OUTOF10: 0
PAINLEVEL_OUTOF10: 0
PAINLEVEL_OUTOF10: 6
PAINLEVEL_OUTOF10: 4

## 2023-02-09 ASSESSMENT — PAIN DESCRIPTION - DESCRIPTORS: DESCRIPTORS: ACHING

## 2023-02-09 ASSESSMENT — PAIN DESCRIPTION - ORIENTATION: ORIENTATION: RIGHT;MID

## 2023-02-09 ASSESSMENT — PAIN DESCRIPTION - LOCATION: LOCATION: BACK;NECK;LEG

## 2023-02-09 NOTE — CARE COORDINATION
02/09/23 [address] Discharge   Transition of Care Consult (CM Consult) SNF   Services At/After Discharge Merged with [facility] (SNF)   [address] Provided? No   Mode of Transport at Discharge BLS   Confirm Follow Up Transport Other (see comment)   Condition of Participation: Discharge Planning   The Patient and/or Patient Representative was provided with a Choice of Provider? Patient   The Patient and/Or Patient Representative agree with the Discharge Plan? Yes   Freedom of Choice list was provided with basic dialogue that supports the patient's individualized plan of care/goals, treatment preferences, and shares the quality data associated with the providers? Yes   Patient is discharging to [address] via [facility]. CM made patient and RN aware of transport time. CM attempted to contact patient's son but his vm is not accepting msgs. CM remains available.

## 2023-02-09 NOTE — DISCHARGE SUMMARY
Hospitalist Discharge Summary   Admit Date:  2023  9:20 AM   DC Note date: 2023  Name:  Olu Lugo   Age:  81 y.o. Sex:  female  :  1950   MRN:  [id number]   Room:  [facility]  PCP:  Erik Saldana MD    Presenting Complaint: Incontinence and Fatigue     Initial Admission Diagnosis: Generalized weakness [R53.1]  Acute UTI [N39.0]  Failure to thrive in adult [R62.7]     Problem List for this Hospitalization (present on admission):    Principal Problem:    Failure to thrive in adult  Active Problems: Moderate protein-calorie malnutrition ([address]    Essential hypertension    Chronic hepatitis C without hepatic coma (HCC)    Anxiety and depression    Coronary artery disease involving native coronary artery of native heart without angina pectoris    Type 2 diabetes mellitus without complication, without long-term current use of insulin (HCC)    Panlobular emphysema ([address]  Resolved Problems:    UTI (urinary tract infection)      Hospital Course:  Ms. Annamaria Ayala is a 80 y/o WF with a h/o HepC, MDD/anxiety, CAD, DM2, CVA with residual R sided weakness, HTN, osteoporosis who was admitted to our service on  with adult failure to thrive. Son lives with her and family friend, Ines Barrios, helps out when he is at work. Urine culture grew E. Coli, s/p 3d ceftriaxone. Now receptive to placement. Night of  had an episode of vomiting and hypoxia, improved O2 needs. Repeat CXR on  was unremarkable. Remains on 2L NC O2 which I would recommend to continue. She is medically stable for discharge to rehab today. Disposition: STR  Diet: ADULT DIET; Regular; 3 carb choices (45 gm/meal); Low Fat/Low Chol/High Fiber/MKIE  ADULT ORAL NUTRITION SUPPLEMENT; Breakfast, Lunch, Dinner; Diabetic Oral Supplement  Code Status: Full Code    Follow Ups:   Contact information for after-discharge care     Discharge Destination     [facility] .     Service: Skilled Nursing  Contact information:  [id number]  [phone]                           Time spent in patient discharge and coordination 35 minutes. Follow up labs/diagnostics (ultimately defer to outpatient provider):  N/A    Plan was discussed with patient, RN, CM. All questions answered. Patient was stable at time of discharge. Instructions given to call a physician or return if any concerns. Current Discharge Medication List        CONTINUE these medications which have CHANGED    Details   traZODone (DESYREL) 100 MG tablet Take 1 tablet by mouth nightly for 5 days  Qty: 5 tablet, Refills: 0           CONTINUE these medications which have NOT CHANGED    Details   Lancets MISC One Touch Penlet Plus. Tests twice daily due to fluctuating blood sugar. .00      Misc. Devices (WALKER) MISC Pacheco Walker to be used to help mobility      aspirin 81 MG EC tablet Take by mouth daily      atorvastatin (LIPITOR) 80 MG tablet Take 80 mg by mouth daily      vitamin D (CHOLECALCIFEROL) 25 MCG (1000 UT) TABS tablet Take by mouth daily      clopidogrel (PLAVIX) 75 MG tablet Take by mouth      DULoxetine (CYMBALTA) 60 MG extended release capsule Take 60 mg by mouth daily      fluconazole (DIFLUCAN) 150 MG tablet Take 150 mg by mouth daily      lisinopril (PRINIVIL;ZESTRIL) 10 MG tablet Take by mouth daily      metFORMIN (GLUCOPHAGE) 500 MG tablet Take by mouth daily (with breakfast)      metoprolol tartrate (LOPRESSOR) 50 MG tablet Take by mouth 2 times daily      nystatin (MYCOSTATIN) 218137 UNIT/GM cream Apply topically 2 times daily             Some medications may have been reported old/obsolete and marked \"stop taking\" by the system; in reality pt was already off these meds; defer to outpatient or prescribing providers.     Procedures done this admission:  * No surgery found *    Consults this admission:  IP CONSULT TO DIETITIAN  IP CONSULT TO PHYSICAL THERAPY  IP CONSULT TO OCCUPATIONAL THERAPY    Echocardiogram results:  No results found for this or any previous visit. Diagnostic Imaging/Tests:   CT HEAD WO CONTRAST    Result Date: 2/6/2023  Old left-sided infarct. No CT evidence of acute intracranial abnormality.     XR CHEST PORTABLE    Result Date: 2/6/2023  No acute findings in the chest        Labs: Results:       BMP, Mg, Phos Recent Labs     02/07/23 0315 02/08/23 0313    138   K 3.9 4.2    106   CO2 26 26   ANIONGAP 5 6   BUN 9 11   CREATININE 0.70 0.60   LABGLOM >60 >60   CALCIUM 7.9* 8.8   GLUCOSE 122* 115*      CBC Recent Labs     02/07/23 0315 02/08/23 0313   WBC 8.8 9.1   RBC 4.12 3.95*   HGB 11.8 11.3*   HCT 38.4 36.2   MCV 93.2 91.6   MCH 28.6 28.6   MCHC 30.7* 31.2*   RDW 14.6 14.6    369   MPV 10.3 10.2   NRBC 0.00 0.00   SEGS 73 66   LYMPHOPCT 12* 15   EOSRELPCT 4 7   MONOPCT 10 10   BASOPCT 1 1   IMMGRAN 1 1   SEGSABS 6.4 6.0   LYMPHSABS 1.0 1.4   EOSABS 0.4 0.7   MONOSABS 0.9 0.9   BASOSABS 0.1 0.1   ABSIMMGRAN 0.1 0.1      LFT Recent Labs     02/08/23 0313   BILITOT 0.6   ALKPHOS 89   AST 17   ALT 16   PROT 6.1*   LABALBU 2.5*   GLOB 3.6      Cardiac  No results found for: NTPROBNP, TROPHS   Coags No results found for: PROTIME, INR, APTT   A1c Lab Results   Component Value Date/Time    LABA1C 5.7 02/07/2023 03:15 AM    LABA1C 5.9 06/01/2021 03:16 PM     02/07/2023 03:15 AM     06/01/2021 03:16 PM      Lipids Lab Results   Component Value Date/Time    CHOL 82 06/01/2021 03:16 PM    LDLCALC 29 06/01/2021 03:16 PM    HDL 32 06/01/2021 03:16 PM    TRIG 114 06/01/2021 03:16 PM      Thyroid  Lab Results   Component Value Date/Time    UGC7MZI 1.120 02/07/2023 03:15 AM        Most Recent UA Lab Results   Component Value Date/Time    COLORU YELLOW/STRAW 02/06/2023 11:06 AM    APPEARANCE CLOUDY 02/06/2023 11:06 AM    SPECGRAV 1.007 02/06/2023 11:06 AM    LABPH 5.5 02/06/2023 11:06 AM    PROTEINU TRACE 02/06/2023 11:06 AM    GLUCOSEU Negative 02/06/2023 11:06 AM    KETUA Negative 02/06/2023 11:06 AM    BILIRUBINUR Negative 02/06/2023 11:06 AM    BLOODU TRACE 02/06/2023 11:06 AM    UROBILINOGEN 1.0 02/06/2023 11:06 AM    NITRU Positive 02/06/2023 11:06 AM    LEUKOCYTESUR LARGE 02/06/2023 11:06 AM    WBCUA  02/06/2023 11:06 AM    EPITHUA 10-20 02/06/2023 11:06 AM    BACTERIA 4+ 02/06/2023 11:06 AM        Recent Labs     02/06/23  1604 02/06/23  1252 02/06/23  1145 02/06/23  1143   CULTURE NO GROWTH AFTER 15 HOURS NO GROWTH AFTER 18 HOURS >100,000 COLONIES/mL Escherichia coli*  10,000 to 50,000 COLONIES/mL MIXED SKIN TONE ISOLATED NO GROWTH AFTER 19 HOURS       All Labs from Last 24 Hrs:  Recent Results (from the past 24 hour(s))   PLEASE READ & DOCUMENT PPD TEST IN 48 HRS    Collection Time: 02/08/23  3:44 PM   Result Value Ref Range    PPD, (POC) Negative Negative    mm Induration 0 0 - 5 mm   POCT Glucose    Collection Time: 02/08/23  4:40 PM   Result Value Ref Range    POC Glucose 118 (H) 65 - 100 mg/dL    Performed by: [facility]thene[facility]    POCT Glucose    Collection Time: 02/08/23  8:48 PM   Result Value Ref Range    POC Glucose 153 (H) 65 - 100 mg/dL    Performed by: Reagan    POCT Glucose    Collection Time: 02/09/23  7:32 AM   Result Value Ref Range    POC Glucose 112 (H) 65 - 100 mg/dL    Performed by: [employer]    POCT Glucose    Collection Time: 02/09/23 11:19 AM   Result Value Ref Range    POC Glucose 120 (H) 65 - 100 mg/dL    Performed by: [facility]Athene[facility]        Allergies   Allergen Reactions    Promethazine Itching     Immunization History   Administered Date(s) Administered    Influenza Trivalent 12/04/2013, 12/17/2014    Influenza Virus Vaccine 10/08/2008    PPD Test 02/06/2023    Pneumococcal Polysaccharide ([id number]) 01/02/2012    Pneumococcal Vaccine 10/08/2008    Tdap (Boostrix, Adacel) 07/02/2019       Recent Vital Data:  Patient Vitals for the past 24 hrs:   Temp Pulse Resp BP SpO2   02/09/23 0734 98.4 °F (36.9 °C) 62 16 (!) 135/91 (!) 89 %   02/09/23 0305 98.1 °F (36.7 °C) 62 16 (!) 147/78 95 %   02/08/23 2348 -- -- 16 -- --   02/08/23 2318 -- -- 18 -- --   02/08/23 2244 97.8 °F (36.6 °C) 62 16 (!) 145/75 96 %   02/08/23 2145 -- 63 -- (!) 151/88 --   02/08/23 1912 98.1 °F (36.7 °C) 71 14 129/87 94 %   02/08/23 1534 98.2 °F (36.8 °C) 64 16 135/88 97 %       Oxygen Therapy  SpO2: (!) 89 %  Pulse Oximeter Device Mode: Intermittent  O2 Device: Nasal cannula  Skin Assessment: Clean, dry, & intact  Skin Protection for O2 Device: No  O2 Flow Rate (L/min): 3 L/min    Estimated body mass index is 22.57 kg/m² as calculated from the following:    Height as of this encounter: 5' 7\" (1.702 m). Weight as of this encounter: 144 lb 1.6 oz (65.4 kg). Intake/Output Summary (Last 24 hours) at 2/9/2023 1225  Last data filed at 2/9/2023 1023  Gross per 24 hour   Intake 720 ml   Output 1750 ml   Net -1030 ml         Physical Exam:  General:          Well nourished, awake, oriented, in bed, comfortable. Head:               Normocephalic, atraumatic  Eyes:               Sclerae appear normal.  Pupils equally round. ENT:                Nares appear normal.  Moist oral mucosa. Poor dentition. Neck:               No restricted ROM. Trachea midline   CV:                  RRR. No m/r/g. No jugular venous distension. Lungs:             Mild rhonchi b/l, no wheezes or rales. Remains on 2L NC O2. Normal effort, comfortable. Abdomen:        Soft, nontender, nondistended. Extremities:     No cyanosis or clubbing. No edema  Skin:                No rashes and normal coloration. Warm and dry. Neuro:             CN II-XII grossly intact. Psych:             Normal mood and affect. Signed:  Georjean Harada, MD    Part of this note may have been written by using a voice dictation software. The note has been proof read but may still contain some grammatical/other typographical errors.

## 2023-02-09 NOTE — PROGRESS NOTES
Pt resting in bed comfortably at this time, alert and oriented times 4. No distress noted, respirations even and unlabored on 3 L NC. Pt denies pain at this time. Pt instructed to call for assistance if needed, call light in place, will continue to monitor.

## 2023-02-09 NOTE — PROGRESS NOTES
Hospitalist Progress Note   Admit Date:  2023  9:20 AM   Name:  Selam Correia   Age:  81 y.o. Sex:  female  :  1950   MRN:  [id number]   Room:  [facility]/    Presenting Complaint: Incontinence and Fatigue     Reason(s) for Admission: Generalized weakness [R53.1]  Acute UTI [N39.0]  Failure to thrive in adult [R62.7]     Hospital Course:   Ms. Elly Betts is a 80 y/o WF with a h/o HepC, MDD/anxiety, CAD, DM2, CVA with residual R sided weakness, HTN, osteoporosis who was admitted to our service on  with adult failure to thrive. Son lives with her and family friend, Chadd Craig, helps out when he is at work. Urine culture growing E. Coli, s/p 3d ceftriaxone. Now receptive to placement. Night of  had an episode of vomiting and hypoxia, improved O2 needs. Subjective & 24hr Events (23):   Comfortable in bed, remains on 2L NC O2, mild desat to 89% recorded overnight. Cough sounds more productive but she says she doesn't feel worse. Assessment & Plan:   # Adult failure to thrive              - Dietitian eval. Con't current plan of care. - Awaiting placement. # Aspiration pneumonitis // acute hypoxemic respiratory failure              - Vomiting episode with desat on night of  to 70s-low 80s, remains stable on 2L NC O2. Repeat CXR . # Acute uncomplicated cystitis 2/ E. Coli   - Resolved, s/p 3d course of ceftriaxone     # CAD              - ASA, statin, Plavix     # HTN              - ACEi, BB     # MDD // anxiety              - Cymbalta     # DM2              - SSI    Anticipated discharge needs: Pending placement. Medically stable. Diet:  ADULT DIET; Regular; 3 carb choices (45 gm/meal);  Low Fat/Low Chol/High Fiber/MIKE  ADULT ORAL NUTRITION SUPPLEMENT; Breakfast, Lunch, Dinner; Diabetic Oral Supplement  DVT PPx: Lovenox  Code status: Full Code    Hospital Problems:  Principal Problem:    Failure to thrive in adult  Active Problems:    UTI (urinary tract infection) Moderate protein-calorie malnutrition ([facility] [address]    Essential hypertension    Chronic hepatitis C without hepatic coma (HCC)    Anxiety and depression    Coronary artery disease involving native coronary artery of native heart without angina pectoris    Type 2 diabetes mellitus without complication, without long-term current use of insulin (HCC)    Panlobular emphysema (HCC)  Resolved Problems:    * No resolved hospital problems. *      Objective:   Patient Vitals for the past 24 hrs:   Temp Pulse Resp BP SpO2   02/09/23 0734 98.4 °F (36.9 °C) 62 16 (!) 135/91 (!) 89 %   02/09/23 0305 98.1 °F (36.7 °C) 62 16 (!) 147/78 95 %   02/08/23 2348 -- -- 16 -- --   02/08/23 2318 -- -- 18 -- --   02/08/23 2244 97.8 °F (36.6 °C) 62 16 (!) 145/75 96 %   02/08/23 2145 -- 63 -- (!) 151/88 --   02/08/23 1912 98.1 °F (36.7 °C) 71 14 129/87 94 %   02/08/23 1534 98.2 °F (36.8 °C) 64 16 135/88 97 %   02/08/23 1108 99 °F (37.2 °C) 56 18 (!) 150/88 96 %       Oxygen Therapy  SpO2: (!) 89 %  Pulse Oximeter Device Mode: Intermittent  O2 Device: Nasal cannula  Skin Assessment: Clean, dry, & intact  Skin Protection for O2 Device: No  O2 Flow Rate (L/min): 3 L/min    Estimated body mass index is 22.57 kg/m² as calculated from the following:    Height as of this encounter: 5' 7\" (1.702 m). Weight as of this encounter: 144 lb 1.6 oz (65.4 kg). Intake/Output Summary (Last 24 hours) at 2/9/2023 0919  Last data filed at 2/9/2023 2663  Gross per 24 hour   Intake 720 ml   Output 2050 ml   Net -1330 ml         Physical Exam:   Blood pressure (!) 135/91, pulse 62, temperature 98.4 °F (36.9 °C), temperature source Oral, resp. rate 16, height 5' 7\" (1.702 m), weight 144 lb 1.6 oz (65.4 kg), SpO2 (!) 89 %. General:    Well nourished, awake, oriented, in bed, comfortable. Head:  Normocephalic, atraumatic  Eyes:  Sclerae appear normal.  Pupils equally round. ENT:  Nares appear normal.  Moist oral mucosa. Poor dentition. Neck:  No restricted ROM. Trachea midline   CV:   RRR. No m/r/g. No jugular venous distension. Lungs:   Mild rhonchi b/l, no wheezes or rales. Remains on 2L NC O2. Normal effort, comfortable. Abdomen:   Soft, nontender, nondistended. Extremities: No cyanosis or clubbing. No edema  Skin:     No rashes and normal coloration. Warm and dry. Neuro:  CN II-XII grossly intact. Psych:  Normal mood and affect.       I have personally reviewed labs and tests:  Recent Labs:  Recent Results (from the past 48 hour(s))   POCT Glucose    Collection Time: 02/07/23 11:20 AM   Result Value Ref Range    POC Glucose 137 (H) 65 - 100 mg/dL    Performed by: BrendenCT    PLEASE READ & DOCUMENT PPD TEST IN 24 HRS    Collection Time: 02/07/23  3:44 PM   Result Value Ref Range    PPD, (POC) Negative Negative    mm Induration 0 0 - 5 mm   POCT Glucose    Collection Time: 02/07/23  5:09 PM   Result Value Ref Range    POC Glucose 274 (H) 65 - 100 mg/dL    Performed by: Silva    POCT Glucose    Collection Time: 02/07/23  8:23 PM   Result Value Ref Range    POC Glucose 169 (H) 65 - 100 mg/dL    Performed by: ElisabethsPCT    Comprehensive Metabolic Panel    Collection Time: 02/08/23  3:13 AM   Result Value Ref Range    Sodium 138 133 - 143 mmol/L    Potassium 4.2 3.5 - 5.1 mmol/L    Chloride 106 101 - 110 mmol/L    CO2 26 21 - 32 mmol/L    Anion Gap 6 2 - 11 mmol/L    Glucose 115 (H) 65 - 100 mg/dL    BUN 11 8 - 23 MG/DL    Creatinine 0.60 0.6 - 1.0 MG/DL    Est, Glom Filt Rate >60 >60 ml/min/1.73m2    Calcium 8.8 8.3 - 10.4 MG/DL    Total Bilirubin 0.6 0.2 - 1.1 MG/DL    ALT 16 12 - 65 U/L    AST 17 15 - 37 U/L    Alk Phosphatase 89 50 - 136 U/L    Total Protein 6.1 (L) 6.3 - 8.2 g/dL    Albumin 2.5 (L) 3.2 - 4.6 g/dL    Globulin 3.6 2.8 - 4.5 g/dL    Albumin/Globulin Ratio 0.7 0.4 - 1.6     CBC with Auto Differential    Collection Time: 02/08/23  3:13 AM   Result Value Ref Range    WBC 9.1 4.3 - 11.1 K/uL    RBC 3.95 (L) 4.05 - 5.2 M/uL    Hemoglobin 11.3 (L) 11.7 - 15.4 g/dL    Hematocrit 36.2 35.8 - 46.3 %    MCV 91.6 82 - 102 FL    MCH 28.6 26.1 - 32.9 PG    MCHC 31.2 (L) 31.4 - 35.0 g/dL    RDW 14.6 11.9 - 14.6 %    Platelets 114 510 - 618 K/uL    MPV 10.2 9.4 - 12.3 FL    nRBC 0.00 0.0 - 0.2 K/uL    Differential Type AUTOMATED      Seg Neutrophils 66 43 - 78 %    Lymphocytes 15 13 - 44 %    Monocytes 10 4.0 - 12.0 %    Eosinophils % 7 0.5 - 7.8 %    Basophils 1 0.0 - 2.0 %    Immature Granulocytes 1 0.0 - 5.0 %    Segs Absolute 6.0 1.7 - 8.2 K/UL    Absolute Lymph # 1.4 0.5 - 4.6 K/UL    Absolute Mono # 0.9 0.1 - 1.3 K/UL    Absolute Eos # 0.7 0.0 - 0.8 K/UL    Basophils Absolute 0.1 0.0 - 0.2 K/UL    Absolute Immature Granulocyte 0.1 0.0 - 0.5 K/UL   POCT Glucose    Collection Time: 02/08/23  7:16 AM   Result Value Ref Range    POC Glucose 140 (H) 65 - 100 mg/dL    Performed by: Bre    POCT Glucose    Collection Time: 02/08/23 11:42 AM   Result Value Ref Range    POC Glucose 129 (H) 65 - 100 mg/dL    Performed by: Bre    PLEASE READ & DOCUMENT PPD TEST IN 48 HRS    Collection Time: 02/08/23  3:44 PM   Result Value Ref Range    PPD, (POC) Negative Negative    mm Induration 0 0 - 5 mm   POCT Glucose    Collection Time: 02/08/23  4:40 PM   Result Value Ref Range    POC Glucose 118 (H) 65 - 100 mg/dL    Performed by: Bre    POCT Glucose    Collection Time: 02/08/23  8:48 PM   Result Value Ref Range    POC Glucose 153 (H) 65 - 100 mg/dL    Performed by: Reagan    POCT Glucose    Collection Time: 02/09/23  7:32 AM   Result Value Ref Range    POC Glucose 112 (H) 65 - 100 mg/dL    Performed by: Bre        I have personally reviewed imaging studies:  Other Studies:  CT HEAD WO CONTRAST   Final Result   Old left-sided infarct. No CT evidence of acute intracranial   abnormality.       XR CHEST PORTABLE   Final Result   No acute findings in the chest             Current Meds:  Current Facility-Administered Medications   Medication Dose Route Frequency    traMADol (ULTRAM) tablet 50 mg  50 mg Oral Q6H PRN    sodium chloride flush 0.9 % injection 5-40 mL  5-40 mL IntraVENous 2 times per day    sodium chloride flush 0.9 % injection 5-40 mL  5-40 mL IntraVENous PRN    0.9 % sodium chloride infusion   IntraVENous PRN    potassium chloride (KLOR-CON M) extended release tablet 40 mEq  40 mEq Oral PRN    Or    potassium bicarb-citric acid (EFFER-K) effervescent tablet 40 mEq  40 mEq Oral PRN    Or    potassium chloride 10 mEq/100 mL IVPB (Peripheral Line)  10 mEq IntraVENous PRN    potassium chloride 10 mEq/100 mL IVPB (Peripheral Line)  10 mEq IntraVENous PRN    magnesium sulfate 2000 mg in 50 mL IVPB premix  2,000 mg IntraVENous PRN    ondansetron (ZOFRAN-ODT) disintegrating tablet 4 mg  4 mg Oral Q8H PRN    Or    ondansetron (ZOFRAN) injection 4 mg  4 mg IntraVENous Q6H PRN    polyethylene glycol (GLYCOLAX) packet 17 g  17 g Oral Daily PRN    bisacodyl (DULCOLAX) suppository 10 mg  10 mg Rectal Daily PRN    famotidine (PEPCID) tablet 10 mg  10 mg Oral Daily PRN    aluminum & magnesium hydroxide-simethicone (MAALOX) 200-200-20 MG/5ML suspension 30 mL  30 mL Oral Q6H PRN    acetaminophen (TYLENOL) tablet 650 mg  650 mg Oral Q6H PRN    Or    acetaminophen (TYLENOL) suppository 650 mg  650 mg Rectal Q6H PRN    enoxaparin (LOVENOX) injection 40 mg  40 mg SubCUTAneous Q24H    aspirin EC tablet 81 mg  81 mg Oral Daily    atorvastatin (LIPITOR) tablet 80 mg  80 mg Oral Nightly    clopidogrel (PLAVIX) tablet 75 mg  75 mg Oral Daily    DULoxetine (CYMBALTA) extended release capsule 60 mg  60 mg Oral Daily    lisinopril (PRINIVIL;ZESTRIL) tablet 5 mg  5 mg Oral Daily    metoprolol tartrate (LOPRESSOR) tablet 25 mg  25 mg Oral BID    traZODone (DESYREL) tablet 100 mg  100 mg Oral Nightly    vitamin D (CHOLECALCIFEROL) tablet 1,000 Units  1,000 Units Oral Daily    nicotine (NICODERM CQ) 14 MG/24HR 1 patch  1 patch TransDERmal Daily    albuterol (PROVENTIL) nebulizer solution 2.5 mg  2.5 mg Nebulization Q6H PRN    insulin lispro (HUMALOG) injection vial 0-8 Units  0-8 Units SubCUTAneous TID WC    insulin lispro (HUMALOG) injection vial 0-4 Units  0-4 Units SubCUTAneous Nightly    glucose chewable tablet 16 g  4 tablet Oral PRN    dextrose bolus 10% 125 mL  125 mL IntraVENous PRN    Or    dextrose bolus 10% 250 mL  250 mL IntraVENous PRN    glucagon (rDNA) injection 1 mg  1 mg SubCUTAneous PRN    dextrose 10 % infusion   IntraVENous Continuous PRN       Signed:  Una Jorgensen MD    Part of this note may have been written by using a voice dictation software. The note has been proof read but may still contain some grammatical/other typographical errors.

## 2023-02-10 ENCOUNTER — CARE COORDINATION (OUTPATIENT)
Dept: CARE COORDINATION | Facility: CLINIC | Age: 73
End: 2023-02-10

## 2023-02-11 LAB
BACTERIA SPEC CULT: NORMAL
SERVICE CMNT-IMP: NORMAL

## 2023-02-13 NOTE — PROGRESS NOTES
Physician Progress Note      PATIENT:               Harmony Nguyen  CSN #:                  [id number]  :                       1950  ADMIT DATE:       2023 9:20 AM  DISCH DATE:        2023 4:22 PM  RESPONDING  PROVIDER #:        Galina Mayer MD          QUERY TEXT:    Pt admitted with failure to thrive. Pt noted to have UTI, malnutrition, and   aspiration pneumonia. If possible, please document in progress notes and   discharge summary the primary cause of failure to thrive. The medical record reflects the following:  Risk Factors: Age, CVA w/ residual R sided weakness, MDD  Clinical Indicators: Patient admitted with UTI, aspiration pneumonia, and   malnutrition. Noted in H&P that patient could no longer get out of bed due to   progressive weakness. Treatment: IV rehydration, Rocephin, PT/OT, rehab placement  Options provided:  -- Weakness primarily due to UTI  -- Weakness primarily due to aspiration pneumonia  -- Weakness primarily due to malnutrition  -- Other - I will add my own diagnosis  -- Disagree - Not applicable / Not valid  -- Disagree - Clinically unable to determine / Unknown  -- Refer to Clinical Documentation Reviewer    PROVIDER RESPONSE TEXT:    This patient has weakness primarily due to UTI. Query created by: Ainsley Duque on 2023 8:48 AM      Electronically signed by:   Galina Mayer MD 2023 8:53 AM

## 2023-02-24 ENCOUNTER — CARE COORDINATION (OUTPATIENT)
Dept: CARE COORDINATION | Facility: CLINIC | Age: 73
End: 2023-02-24

## 2023-03-03 ENCOUNTER — CARE COORDINATION (OUTPATIENT)
Dept: CARE COORDINATION | Facility: CLINIC | Age: 73
End: 2023-03-03

## 2023-03-03 NOTE — CARE COORDINATION
Opened chart for SNF follow up. Have left voice message requesting discharge disposition x's 2 without return call form SW. Notified this day that patient no longer on in house roster. Contact information provided in chart for patient and son James Horton has been disconnected and no new contact number provided. No further outreach indicated at this time.

## 2025-03-19 ENCOUNTER — HOSPITAL ENCOUNTER (EMERGENCY)
Age: 75
Discharge: HOME | End: 2025-03-19
Attending: STUDENT IN AN ORGANIZED HEALTH CARE EDUCATION/TRAINING PROGRAM
Payer: MEDICARE

## 2025-03-19 VITALS
DIASTOLIC BLOOD PRESSURE: 115 MMHG | TEMPERATURE: 98.3 F | HEART RATE: 88 BPM | RESPIRATION RATE: 16 BRPM | OXYGEN SATURATION: 100 % | SYSTOLIC BLOOD PRESSURE: 194 MMHG

## 2025-03-19 DIAGNOSIS — N39.0 URINARY TRACT INFECTION WITHOUT HEMATURIA, SITE UNSPECIFIED: Primary | ICD-10-CM

## 2025-03-19 LAB
APPEARANCE UR: ABNORMAL
BACTERIA URNS QL MICRO: ABNORMAL /HPF
BILIRUB UR QL: NEGATIVE
CASTS URNS QL MICRO: ABNORMAL /LPF
COLOR UR: ABNORMAL
EPI CELLS #/AREA URNS HPF: ABNORMAL /HPF
GLUCOSE UR STRIP.AUTO-MCNC: NEGATIVE MG/DL
HGB UR QL STRIP: ABNORMAL
KETONES UR QL STRIP.AUTO: NEGATIVE MG/DL
LEUKOCYTE ESTERASE UR QL STRIP.AUTO: ABNORMAL
NITRITE UR QL STRIP.AUTO: POSITIVE
OTHER OBSERVATIONS: ABNORMAL
PH UR STRIP: 8 (ref 5–9)
PROT UR STRIP-MCNC: ABNORMAL MG/DL
RBC #/AREA URNS HPF: ABNORMAL /HPF
SERVICE CMNT-IMP: NORMAL
SP GR UR REFRACTOMETRY: 1 (ref 1–1.02)
UROBILINOGEN UR QL STRIP.AUTO: 1 EU/DL (ref 0.2–1)
WBC URNS QL MICRO: ABNORMAL /HPF
WET PREP GENITAL: NORMAL

## 2025-03-19 PROCEDURE — 87186 SC STD MICRODIL/AGAR DIL: CPT

## 2025-03-19 PROCEDURE — 87086 URINE CULTURE/COLONY COUNT: CPT

## 2025-03-19 PROCEDURE — 87210 SMEAR WET MOUNT SALINE/INK: CPT

## 2025-03-19 PROCEDURE — 87088 URINE BACTERIA CULTURE: CPT

## 2025-03-19 PROCEDURE — 96372 THER/PROPH/DIAG INJ SC/IM: CPT

## 2025-03-19 PROCEDURE — 6370000000 HC RX 637 (ALT 250 FOR IP): Performed by: STUDENT IN AN ORGANIZED HEALTH CARE EDUCATION/TRAINING PROGRAM

## 2025-03-19 PROCEDURE — 6360000002 HC RX W HCPCS: Performed by: STUDENT IN AN ORGANIZED HEALTH CARE EDUCATION/TRAINING PROGRAM

## 2025-03-19 PROCEDURE — 81001 URINALYSIS AUTO W/SCOPE: CPT

## 2025-03-19 PROCEDURE — 99284 EMERGENCY DEPT VISIT MOD MDM: CPT

## 2025-03-19 RX ORDER — ACETAMINOPHEN 500 MG
500 TABLET ORAL
Status: DISCONTINUED | OUTPATIENT
Start: 2025-03-19 | End: 2025-03-19

## 2025-03-19 RX ORDER — CEPHALEXIN 500 MG/1
500 CAPSULE ORAL 4 TIMES DAILY
Qty: 28 CAPSULE | Refills: 0 | Status: SHIPPED | OUTPATIENT
Start: 2025-03-19 | End: 2025-03-26

## 2025-03-19 RX ORDER — IBUPROFEN 400 MG/1
400 TABLET, FILM COATED ORAL
Status: COMPLETED | OUTPATIENT
Start: 2025-03-19 | End: 2025-03-19

## 2025-03-19 RX ADMIN — LIDOCAINE HYDROCHLORIDE 1000 MG: 10 INJECTION, SOLUTION INFILTRATION; PERINEURAL at 06:06

## 2025-03-19 RX ADMIN — IBUPROFEN 400 MG: 400 TABLET, FILM COATED ORAL at 04:35

## 2025-03-19 ASSESSMENT — LIFESTYLE VARIABLES
HOW OFTEN DO YOU HAVE A DRINK CONTAINING ALCOHOL: NEVER
HOW MANY STANDARD DRINKS CONTAINING ALCOHOL DO YOU HAVE ON A TYPICAL DAY: PATIENT DOES NOT DRINK

## 2025-03-19 ASSESSMENT — PAIN SCALES - GENERAL: PAINLEVEL_OUTOF10: 5

## 2025-03-19 NOTE — ED TRIAGE NOTES
Pt BIB EMS from home. Pt recently diagnosed with UTI. Completed antibiotics. Pt states \"my pussy hurts\".

## 2025-03-19 NOTE — ED PROVIDER NOTES
appreciated.  Neuro: Alert and oriented, moves all four extremities.  Psych: Pleasant and cooperative.    Procedures   Procedures    MDM     Labs Reviewed   URINALYSIS - Abnormal; Notable for the following components:       Result Value    Protein, UA TRACE (*)     Blood, Urine SMALL (*)     Nitrite, Urine Positive (*)     Leukocyte Esterase, Urine LARGE (*)     BACTERIA, URINE 4+ (*)     All other components within normal limits   WET PREP, GENITAL   CULTURE, URINE     Medications   cefTRIAXone (ROCEPHIN) 1,000 mg in lidocaine 1 % 2.86 mL IM Injection (has no administration in time range)   ibuprofen (ADVIL;MOTRIN) tablet 400 mg (400 mg Oral Given 3/19/25 6650)     No orders to display        Brenton Núñez MD  03/19/25 7704

## 2025-03-19 NOTE — DISCHARGE INSTRUCTIONS
Your labs today showed evidence of a urinary tract infection.  You have been given a dose of IM antibiotics while in the ER.  Continue to take the oral antibiotics as prescribed.  Take Tylenol or Motrin as needed for any pain or irritation.  Please call your primary care doctor for recheck of your urine.  Return to the ER if any new or worsening symptoms

## 2025-03-21 LAB
BACTERIA SPEC CULT: ABNORMAL
BACTERIA SPEC CULT: ABNORMAL
SERVICE CMNT-IMP: ABNORMAL

## 2025-03-25 ENCOUNTER — RESULTS FOLLOW-UP (OUTPATIENT)
Dept: EMERGENCY DEPT | Age: 75
End: 2025-03-25

## 2025-07-15 ENCOUNTER — APPOINTMENT (OUTPATIENT)
Dept: CT IMAGING | Age: 75
End: 2025-07-15
Payer: MEDICARE

## 2025-07-15 ENCOUNTER — APPOINTMENT (OUTPATIENT)
Dept: GENERAL RADIOLOGY | Age: 75
End: 2025-07-15
Payer: MEDICARE

## 2025-07-15 ENCOUNTER — HOSPITAL ENCOUNTER (INPATIENT)
Age: 75
LOS: 5 days | Discharge: HOME | DRG: 872 | End: 2025-07-20
Attending: FAMILY MEDICINE | Admitting: INTERNAL MEDICINE
Payer: MEDICARE

## 2025-07-15 ENCOUNTER — APPOINTMENT (OUTPATIENT)
Dept: ULTRASOUND IMAGING | Age: 75
End: 2025-07-15
Payer: MEDICARE

## 2025-07-15 ENCOUNTER — HOSPITAL ENCOUNTER (EMERGENCY)
Age: 75
Discharge: ANOTHER HOSPITAL | End: 2025-07-15
Attending: STUDENT IN AN ORGANIZED HEALTH CARE EDUCATION/TRAINING PROGRAM | Admitting: INTERNAL MEDICINE
Payer: MEDICARE

## 2025-07-15 VITALS
WEIGHT: 110 LBS | OXYGEN SATURATION: 95 % | RESPIRATION RATE: 20 BRPM | DIASTOLIC BLOOD PRESSURE: 68 MMHG | TEMPERATURE: 98.1 F | BODY MASS INDEX: 17.27 KG/M2 | HEIGHT: 67 IN | HEART RATE: 96 BPM | SYSTOLIC BLOOD PRESSURE: 95 MMHG

## 2025-07-15 DIAGNOSIS — F41.9 ANXIETY AND DEPRESSION: ICD-10-CM

## 2025-07-15 DIAGNOSIS — N10 ACUTE PYELONEPHRITIS: Primary | ICD-10-CM

## 2025-07-15 DIAGNOSIS — R52 PAIN: Primary | ICD-10-CM

## 2025-07-15 DIAGNOSIS — I10 ESSENTIAL HYPERTENSION: ICD-10-CM

## 2025-07-15 DIAGNOSIS — E87.6 HYPOKALEMIA: ICD-10-CM

## 2025-07-15 DIAGNOSIS — I25.10 CVD (CARDIOVASCULAR DISEASE): ICD-10-CM

## 2025-07-15 DIAGNOSIS — E11.9 TYPE 2 DIABETES MELLITUS WITHOUT COMPLICATION, WITHOUT LONG-TERM CURRENT USE OF INSULIN (HCC): ICD-10-CM

## 2025-07-15 DIAGNOSIS — R45.4 IRRITABILITY: ICD-10-CM

## 2025-07-15 DIAGNOSIS — E83.42 HYPOMAGNESEMIA: ICD-10-CM

## 2025-07-15 DIAGNOSIS — J43.1 PANLOBULAR EMPHYSEMA (HCC): ICD-10-CM

## 2025-07-15 DIAGNOSIS — R62.7 FAILURE TO THRIVE IN ADULT: ICD-10-CM

## 2025-07-15 DIAGNOSIS — K52.9 COLITIS: ICD-10-CM

## 2025-07-15 DIAGNOSIS — B18.2 CHRONIC HEPATITIS C WITHOUT HEPATIC COMA (HCC): ICD-10-CM

## 2025-07-15 DIAGNOSIS — N30.90 CYSTITIS: ICD-10-CM

## 2025-07-15 DIAGNOSIS — A41.9 SEPSIS, DUE TO UNSPECIFIED ORGANISM, UNSPECIFIED WHETHER ACUTE ORGAN DYSFUNCTION PRESENT (HCC): ICD-10-CM

## 2025-07-15 DIAGNOSIS — N12 PYELONEPHRITIS: ICD-10-CM

## 2025-07-15 DIAGNOSIS — F32.A ANXIETY AND DEPRESSION: ICD-10-CM

## 2025-07-15 DIAGNOSIS — I25.10 CORONARY ARTERY DISEASE INVOLVING NATIVE CORONARY ARTERY OF NATIVE HEART WITHOUT ANGINA PECTORIS: ICD-10-CM

## 2025-07-15 DIAGNOSIS — E44.0 MODERATE PROTEIN-CALORIE MALNUTRITION: Chronic | ICD-10-CM

## 2025-07-15 DIAGNOSIS — R53.1 RIGHT SIDED WEAKNESS: ICD-10-CM

## 2025-07-15 DIAGNOSIS — R33.9 URINE RETENTION: ICD-10-CM

## 2025-07-15 DIAGNOSIS — E78.2 MIXED HYPERLIPIDEMIA: ICD-10-CM

## 2025-07-15 LAB
ALBUMIN SERPL-MCNC: 2.6 G/DL (ref 3.2–4.6)
ALBUMIN/GLOB SERPL: 0.6 (ref 1–1.9)
ALP SERPL-CCNC: 76 U/L (ref 35–104)
ALT SERPL-CCNC: 12 U/L (ref 8–45)
AMORPH CRY URNS QL MICRO: ABNORMAL
ANION GAP SERPL CALC-SCNC: 17 MMOL/L (ref 7–16)
APPEARANCE UR: ABNORMAL
AST SERPL-CCNC: 23 U/L (ref 15–37)
BACTERIA URNS QL MICRO: ABNORMAL /HPF
BASOPHILS # BLD: 0.09 K/UL (ref 0–0.2)
BASOPHILS NFR BLD: 0.4 % (ref 0–2)
BILIRUB SERPL-MCNC: 0.8 MG/DL (ref 0–1.2)
BILIRUB UR QL: NEGATIVE
BUN SERPL-MCNC: 15 MG/DL (ref 8–23)
CALCIUM SERPL-MCNC: 8.8 MG/DL (ref 8.8–10.2)
CHLORIDE SERPL-SCNC: 98 MMOL/L (ref 98–107)
CO2 SERPL-SCNC: 18 MMOL/L (ref 20–29)
COLOR UR: ABNORMAL
CREAT SERPL-MCNC: 0.79 MG/DL (ref 0.6–1.1)
DIFFERENTIAL METHOD BLD: ABNORMAL
EKG ATRIAL RATE: 99 BPM
EKG DIAGNOSIS: NORMAL
EKG P AXIS: 74 DEGREES
EKG P-R INTERVAL: 139 MS
EKG Q-T INTERVAL: 393 MS
EKG QRS DURATION: 133 MS
EKG QTC CALCULATION (BAZETT): 505 MS
EKG R AXIS: 37 DEGREES
EKG T AXIS: 13 DEGREES
EKG VENTRICULAR RATE: 99 BPM
EOSINOPHIL # BLD: 0 K/UL (ref 0–0.8)
EOSINOPHIL NFR BLD: 0 % (ref 0.5–7.8)
EPI CELLS #/AREA URNS HPF: ABNORMAL /HPF
ERYTHROCYTE [DISTWIDTH] IN BLOOD BY AUTOMATED COUNT: 13.2 % (ref 11.9–14.6)
GLOBULIN SER CALC-MCNC: 4.7 G/DL (ref 2.3–3.5)
GLUCOSE SERPL-MCNC: 169 MG/DL (ref 70–99)
GLUCOSE UR STRIP.AUTO-MCNC: NEGATIVE MG/DL
HCT VFR BLD AUTO: 45.2 % (ref 35.8–46.3)
HGB BLD-MCNC: 14.3 G/DL (ref 11.7–15.4)
HGB UR QL STRIP: ABNORMAL
IMM GRANULOCYTES # BLD AUTO: 0.81 K/UL (ref 0–0.5)
IMM GRANULOCYTES NFR BLD AUTO: 3.3 % (ref 0–5)
KETONES UR QL STRIP.AUTO: NEGATIVE MG/DL
LACTATE SERPL-SCNC: 1.5 MMOL/L (ref 0.5–2)
LACTATE SERPL-SCNC: 2.6 MMOL/L (ref 0.5–2)
LACTATE SERPL-SCNC: 2.8 MMOL/L (ref 0.5–2)
LACTATE SERPL-SCNC: 2.9 MMOL/L (ref 0.5–2)
LEUKOCYTE ESTERASE UR QL STRIP.AUTO: ABNORMAL
LYMPHOCYTES # BLD: 1.21 K/UL (ref 0.5–4.6)
LYMPHOCYTES NFR BLD: 5 % (ref 13–44)
MAGNESIUM SERPL-MCNC: 1.7 MG/DL (ref 1.8–2.4)
MCH RBC QN AUTO: 30.4 PG (ref 26.1–32.9)
MCHC RBC AUTO-ENTMCNC: 31.6 G/DL (ref 31.4–35)
MCV RBC AUTO: 96.2 FL (ref 82–102)
MONOCYTES # BLD: 1.61 K/UL (ref 0.1–1.3)
MONOCYTES NFR BLD: 6.6 % (ref 4–12)
NEUTS SEG # BLD: 20.61 K/UL (ref 1.7–8.2)
NEUTS SEG NFR BLD: 84.7 % (ref 43–78)
NITRITE UR QL STRIP.AUTO: NEGATIVE
NRBC # BLD: 0 K/UL (ref 0–0.2)
OTHER OBSERVATIONS: ABNORMAL
PH UR STRIP: 6.5 (ref 5–9)
PLATELET # BLD AUTO: 372 K/UL (ref 150–450)
PMV BLD AUTO: 9.2 FL (ref 9.4–12.3)
POTASSIUM SERPL-SCNC: 3.8 MMOL/L (ref 3.5–5.1)
PROCALCITONIN SERPL-MCNC: 0.31 NG/ML (ref 0–0.1)
PROT SERPL-MCNC: 7.3 G/DL (ref 6.3–8.2)
PROT UR STRIP-MCNC: 30 MG/DL
RBC # BLD AUTO: 4.7 M/UL (ref 4.05–5.2)
RBC #/AREA URNS HPF: ABNORMAL /HPF
SODIUM SERPL-SCNC: 133 MMOL/L (ref 136–145)
SP GR UR REFRACTOMETRY: >1.035 (ref 1–1.02)
TROPONIN T SERPL HS-MCNC: 18.8 NG/L (ref 0–14)
UROBILINOGEN UR QL STRIP.AUTO: 1 EU/DL (ref 0.2–1)
WBC # BLD AUTO: 24.3 K/UL (ref 4.3–11.1)
WBC URNS QL MICRO: ABNORMAL /HPF

## 2025-07-15 PROCEDURE — 2580000003 HC RX 258: Performed by: STUDENT IN AN ORGANIZED HEALTH CARE EDUCATION/TRAINING PROGRAM

## 2025-07-15 PROCEDURE — 87040 BLOOD CULTURE FOR BACTERIA: CPT

## 2025-07-15 PROCEDURE — 80053 COMPREHEN METABOLIC PANEL: CPT

## 2025-07-15 PROCEDURE — 6360000002 HC RX W HCPCS: Performed by: STUDENT IN AN ORGANIZED HEALTH CARE EDUCATION/TRAINING PROGRAM

## 2025-07-15 PROCEDURE — 1100000000 HC RM PRIVATE

## 2025-07-15 PROCEDURE — 96375 TX/PRO/DX INJ NEW DRUG ADDON: CPT

## 2025-07-15 PROCEDURE — 76856 US EXAM PELVIC COMPLETE: CPT

## 2025-07-15 PROCEDURE — 6360000002 HC RX W HCPCS: Performed by: INTERNAL MEDICINE

## 2025-07-15 PROCEDURE — 6360000004 HC RX CONTRAST MEDICATION: Performed by: STUDENT IN AN ORGANIZED HEALTH CARE EDUCATION/TRAINING PROGRAM

## 2025-07-15 PROCEDURE — 96366 THER/PROPH/DIAG IV INF ADDON: CPT

## 2025-07-15 PROCEDURE — 36415 COLL VENOUS BLD VENIPUNCTURE: CPT

## 2025-07-15 PROCEDURE — 71045 X-RAY EXAM CHEST 1 VIEW: CPT

## 2025-07-15 PROCEDURE — 74177 CT ABD & PELVIS W/CONTRAST: CPT

## 2025-07-15 PROCEDURE — 96365 THER/PROPH/DIAG IV INF INIT: CPT

## 2025-07-15 PROCEDURE — 85025 COMPLETE CBC W/AUTO DIFF WBC: CPT

## 2025-07-15 PROCEDURE — 2500000003 HC RX 250 WO HCPCS: Performed by: STUDENT IN AN ORGANIZED HEALTH CARE EDUCATION/TRAINING PROGRAM

## 2025-07-15 PROCEDURE — 84484 ASSAY OF TROPONIN QUANT: CPT

## 2025-07-15 PROCEDURE — 2580000003 HC RX 258: Performed by: INTERNAL MEDICINE

## 2025-07-15 PROCEDURE — 93005 ELECTROCARDIOGRAM TRACING: CPT | Performed by: STUDENT IN AN ORGANIZED HEALTH CARE EDUCATION/TRAINING PROGRAM

## 2025-07-15 PROCEDURE — 84145 PROCALCITONIN (PCT): CPT

## 2025-07-15 PROCEDURE — 99285 EMERGENCY DEPT VISIT HI MDM: CPT

## 2025-07-15 PROCEDURE — 83605 ASSAY OF LACTIC ACID: CPT

## 2025-07-15 PROCEDURE — 83735 ASSAY OF MAGNESIUM: CPT

## 2025-07-15 PROCEDURE — 81001 URINALYSIS AUTO W/SCOPE: CPT

## 2025-07-15 PROCEDURE — 96368 THER/DIAG CONCURRENT INF: CPT

## 2025-07-15 PROCEDURE — 96361 HYDRATE IV INFUSION ADD-ON: CPT

## 2025-07-15 PROCEDURE — 93010 ELECTROCARDIOGRAM REPORT: CPT | Performed by: INTERNAL MEDICINE

## 2025-07-15 PROCEDURE — 96367 TX/PROPH/DG ADDL SEQ IV INF: CPT

## 2025-07-15 RX ORDER — BISACODYL 10 MG
10 SUPPOSITORY, RECTAL RECTAL DAILY PRN
Status: CANCELLED | OUTPATIENT
Start: 2025-07-15

## 2025-07-15 RX ORDER — INSULIN LISPRO 100 [IU]/ML
0-8 INJECTION, SOLUTION INTRAVENOUS; SUBCUTANEOUS
Status: CANCELLED | OUTPATIENT
Start: 2025-07-15

## 2025-07-15 RX ORDER — IOPAMIDOL 755 MG/ML
100 INJECTION, SOLUTION INTRAVASCULAR
Status: COMPLETED | OUTPATIENT
Start: 2025-07-15 | End: 2025-07-15

## 2025-07-15 RX ORDER — POLYETHYLENE GLYCOL 3350 17 G/17G
17 POWDER, FOR SOLUTION ORAL DAILY PRN
Status: CANCELLED | OUTPATIENT
Start: 2025-07-15

## 2025-07-15 RX ORDER — MIRTAZAPINE 15 MG/1
7.5 TABLET, FILM COATED ORAL NIGHTLY
Status: CANCELLED | OUTPATIENT
Start: 2025-07-15

## 2025-07-15 RX ORDER — CLOPIDOGREL BISULFATE 75 MG/1
75 TABLET ORAL DAILY
Status: CANCELLED | OUTPATIENT
Start: 2025-07-16

## 2025-07-15 RX ORDER — MAGNESIUM HYDROXIDE/ALUMINUM HYDROXICE/SIMETHICONE 120; 1200; 1200 MG/30ML; MG/30ML; MG/30ML
30 SUSPENSION ORAL EVERY 6 HOURS PRN
Status: CANCELLED | OUTPATIENT
Start: 2025-07-15

## 2025-07-15 RX ORDER — ENOXAPARIN SODIUM 100 MG/ML
30 INJECTION SUBCUTANEOUS DAILY
Status: CANCELLED | OUTPATIENT
Start: 2025-07-15

## 2025-07-15 RX ORDER — SODIUM CHLORIDE 9 MG/ML
INJECTION, SOLUTION INTRAVENOUS PRN
Status: CANCELLED | OUTPATIENT
Start: 2025-07-15

## 2025-07-15 RX ORDER — ACETAMINOPHEN 325 MG/1
650 TABLET ORAL EVERY 6 HOURS PRN
Status: CANCELLED | OUTPATIENT
Start: 2025-07-15

## 2025-07-15 RX ORDER — IBUPROFEN 600 MG/1
1 TABLET ORAL PRN
Status: CANCELLED | OUTPATIENT
Start: 2025-07-15

## 2025-07-15 RX ORDER — METOPROLOL TARTRATE 25 MG/1
100 TABLET, FILM COATED ORAL 2 TIMES DAILY
Status: CANCELLED | OUTPATIENT
Start: 2025-07-15

## 2025-07-15 RX ORDER — MAGNESIUM SULFATE IN WATER 40 MG/ML
2000 INJECTION, SOLUTION INTRAVENOUS PRN
Status: CANCELLED | OUTPATIENT
Start: 2025-07-15

## 2025-07-15 RX ORDER — SODIUM CHLORIDE 0.9 % (FLUSH) 0.9 %
5-40 SYRINGE (ML) INJECTION PRN
Status: CANCELLED | OUTPATIENT
Start: 2025-07-15

## 2025-07-15 RX ORDER — ONDANSETRON 4 MG/1
4 TABLET, ORALLY DISINTEGRATING ORAL EVERY 8 HOURS PRN
Status: CANCELLED | OUTPATIENT
Start: 2025-07-15

## 2025-07-15 RX ORDER — ONDANSETRON 2 MG/ML
4 INJECTION INTRAMUSCULAR; INTRAVENOUS EVERY 6 HOURS PRN
Status: CANCELLED | OUTPATIENT
Start: 2025-07-15

## 2025-07-15 RX ORDER — ACETAMINOPHEN 650 MG/1
650 SUPPOSITORY RECTAL EVERY 6 HOURS PRN
Status: CANCELLED | OUTPATIENT
Start: 2025-07-15

## 2025-07-15 RX ORDER — SODIUM CHLORIDE, SODIUM LACTATE, POTASSIUM CHLORIDE, AND CALCIUM CHLORIDE .6; .31; .03; .02 G/100ML; G/100ML; G/100ML; G/100ML
1000 INJECTION, SOLUTION INTRAVENOUS ONCE
Status: COMPLETED | OUTPATIENT
Start: 2025-07-15 | End: 2025-07-15

## 2025-07-15 RX ORDER — ATORVASTATIN CALCIUM 80 MG/1
80 TABLET, FILM COATED ORAL NIGHTLY
Status: CANCELLED | OUTPATIENT
Start: 2025-07-15

## 2025-07-15 RX ORDER — LISINOPRIL 5 MG/1
10 TABLET ORAL DAILY
Status: CANCELLED | OUTPATIENT
Start: 2025-07-15

## 2025-07-15 RX ORDER — POTASSIUM CHLORIDE 7.45 MG/ML
10 INJECTION INTRAVENOUS PRN
Status: CANCELLED | OUTPATIENT
Start: 2025-07-15 | End: 2025-10-22

## 2025-07-15 RX ORDER — DULOXETIN HYDROCHLORIDE 60 MG/1
60 CAPSULE, DELAYED RELEASE ORAL DAILY
Status: CANCELLED | OUTPATIENT
Start: 2025-07-16

## 2025-07-15 RX ORDER — SODIUM CHLORIDE 9 MG/ML
INJECTION, SOLUTION INTRAVENOUS CONTINUOUS
Status: CANCELLED | OUTPATIENT
Start: 2025-07-15 | End: 2025-07-16

## 2025-07-15 RX ORDER — FAMOTIDINE 20 MG/1
10 TABLET, FILM COATED ORAL DAILY PRN
Status: CANCELLED | OUTPATIENT
Start: 2025-07-15

## 2025-07-15 RX ORDER — MAGNESIUM SULFATE IN WATER 40 MG/ML
2000 INJECTION, SOLUTION INTRAVENOUS ONCE
Status: COMPLETED | OUTPATIENT
Start: 2025-07-15 | End: 2025-07-15

## 2025-07-15 RX ORDER — POTASSIUM CHLORIDE 1500 MG/1
40 TABLET, EXTENDED RELEASE ORAL PRN
Status: CANCELLED | OUTPATIENT
Start: 2025-07-15 | End: 2025-10-22

## 2025-07-15 RX ORDER — MIRTAZAPINE 7.5 MG/1
7.5 TABLET, FILM COATED ORAL NIGHTLY
COMMUNITY

## 2025-07-15 RX ORDER — SODIUM CHLORIDE 0.9 % (FLUSH) 0.9 %
5-40 SYRINGE (ML) INJECTION EVERY 12 HOURS SCHEDULED
Status: CANCELLED | OUTPATIENT
Start: 2025-07-15

## 2025-07-15 RX ORDER — DEXTROSE MONOHYDRATE 100 MG/ML
INJECTION, SOLUTION INTRAVENOUS CONTINUOUS PRN
Status: CANCELLED | OUTPATIENT
Start: 2025-07-15

## 2025-07-15 RX ORDER — SODIUM CHLORIDE, SODIUM LACTATE, POTASSIUM CHLORIDE, AND CALCIUM CHLORIDE .6; .31; .03; .02 G/100ML; G/100ML; G/100ML; G/100ML
1500 INJECTION, SOLUTION INTRAVENOUS ONCE
Status: COMPLETED | OUTPATIENT
Start: 2025-07-15 | End: 2025-07-15

## 2025-07-15 RX ORDER — ASPIRIN 81 MG/1
81 TABLET ORAL DAILY
Status: CANCELLED | OUTPATIENT
Start: 2025-07-16

## 2025-07-15 RX ADMIN — SODIUM CHLORIDE, SODIUM LACTATE, POTASSIUM CHLORIDE, AND CALCIUM CHLORIDE 1000 ML: .6; .31; .03; .02 INJECTION, SOLUTION INTRAVENOUS at 11:22

## 2025-07-15 RX ADMIN — VANCOMYCIN HYDROCHLORIDE 1250 MG: 10 INJECTION, POWDER, LYOPHILIZED, FOR SOLUTION INTRAVENOUS at 16:01

## 2025-07-15 RX ADMIN — MAGNESIUM SULFATE HEPTAHYDRATE 2000 MG: 40 INJECTION, SOLUTION INTRAVENOUS at 16:06

## 2025-07-15 RX ADMIN — CEFTRIAXONE 1000 MG: 1 INJECTION, POWDER, FOR SOLUTION INTRAMUSCULAR; INTRAVENOUS at 14:42

## 2025-07-15 RX ADMIN — SODIUM CHLORIDE, SODIUM LACTATE, POTASSIUM CHLORIDE, AND CALCIUM CHLORIDE 1500 ML: .6; .31; .03; .02 INJECTION, SOLUTION INTRAVENOUS at 14:46

## 2025-07-15 RX ADMIN — PIPERACILLIN AND TAZOBACTAM 4500 MG: 4; .5 INJECTION, POWDER, LYOPHILIZED, FOR SOLUTION INTRAVENOUS at 17:42

## 2025-07-15 RX ADMIN — IOPAMIDOL 100 ML: 755 INJECTION, SOLUTION INTRAVENOUS at 14:29

## 2025-07-15 ASSESSMENT — ENCOUNTER SYMPTOMS
NAUSEA: 0
EYE PAIN: 0
ABDOMINAL PAIN: 0
WHEEZING: 0
EYE DISCHARGE: 0
VOMITING: 0
SORE THROAT: 0
SHORTNESS OF BREATH: 0

## 2025-07-15 ASSESSMENT — PAIN - FUNCTIONAL ASSESSMENT
PAIN_FUNCTIONAL_ASSESSMENT: NONE - DENIES PAIN
PAIN_FUNCTIONAL_ASSESSMENT: NONE - DENIES PAIN

## 2025-07-15 ASSESSMENT — LIFESTYLE VARIABLES
HOW MANY STANDARD DRINKS CONTAINING ALCOHOL DO YOU HAVE ON A TYPICAL DAY: PATIENT DOES NOT DRINK
HOW OFTEN DO YOU HAVE A DRINK CONTAINING ALCOHOL: NEVER

## 2025-07-15 NOTE — ED PROVIDER NOTES
contracted.   5. Severe vascular calcifications.         If providers have any questions about this report, I can be reached on   [employer].         Electronically signed by Nilo BOYLE CHEST PORTABLE   Final Result      1.  No acute cardiopulmonary process.         Electronically signed by Manuela Sims      US PELVIS COMPLETE    (Results Pending)                No results for input(s): \"COVID19\" in the last 72 hours.     Voice dictation software was used during the making of this note.  This software is not perfect and grammatical and other typographical errors may be present.  This note has not been completely proofread for errors.     Faisal Hayden MD  07/16/25 0858

## 2025-07-15 NOTE — CARE COORDINATION
RN CM attempted to meet with the patient in the Emergency Department to discuss discharge planning. The patient is a potential admission to the hospital. She is currently off the unit. No visitors were present.

## 2025-07-15 NOTE — H&P
Nondistended bladder. No stones. - REPRODUCTIVE ORGANS: Uterus is present. Some fluid and gas in the vagina. - BOWEL: Diffuse colonic contraction and mural thickening suggesting diffuse colitis. No bowel obstruction. Normal appendix. Mild distention of the rectum with stool and gas. - LYMPH NODES: No significant retroperitoneal, mesenteric, or pelvic adenopathy. 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 Mild diffuse degenerative changes. Bones are demineralized. Old rib fractures. - VASCULATURE: Severe aortoiliac calcified plaque. Severe stenoses of the major branch vessels. Total left hip arthroplasty. Marked bilateral CFA stenoses. - OTHER: No ascites.     1. Mild left-sided pyelonephritis without abscess or hydronephrosis. 2. Cystitis with asymmetric increased thickening of the right bladder wall up to 1.2 cm. Cystoscopy may be considered to ensure benign appearance and exclude any potential sessile tumor. 3. Mild fluid and gas in the vagina of uncertain etiology. 4. Mild diffuse colitis, colon is also diffusely contracted. 5. Severe vascular calcifications. If providers have any questions about this report, I can be reached on [employer]. Electronically signed by Nilo Rosario    XR CHEST PORTABLE  Result Date: 7/15/2025  EXAMINATION: XR CHEST PORTABLE EXAM DATE: 7/15/2025 11:29 AM INDICATION: Sepsis COMPARISON: February 9, 2023 TECHNIQUE:  Single AP view of the chest. FINDINGS: The lungs are clear.  No pleural effusion or pneumothorax. The cardiomediastinal silhouette is normal in size. A coronary artery stent is noted. No acute osseous or soft tissue abnormality. There is a chronic fracture deformity at the distal right clavicle. Degenerative changes are present in the right shoulder with a possible chronic loose body.     1.  No acute cardiopulmonary process. Electronically signed by Manuela Sims        Signed:  Triston Bernabe MD    Part of this note may have been written by

## 2025-07-15 NOTE — DISCHARGE SUMMARY
Patient being transferred to [facility] due to bed availability.    Please see H&P for further information

## 2025-07-15 NOTE — ED TRIAGE NOTES
Pt arrived via EMS form home c/o generalized weakness and malaise. X 4-5 days. Pt having UTI symptoms. EMS gave 300 mL NS, 3.375 Zosyn. , Resp 28-32. . A&O x 3.     Will complete 1L NS started by EMS per provider

## 2025-07-15 NOTE — H&P
Patient being transferred from [facility] due to bed availability.     Please see H&P for further information.

## 2025-07-15 NOTE — ED NOTES
TRANSFER - OUT REPORT:    Verbal report given to LOLIS Zhang on Liss Puckett  being transferred to 604 for routine progression of patient care       Report consisted of patient's Situation, Background, Assessment and   Recommendations(SBAR).     Information from the following report(s) ED Encounter Summary was reviewed with the receiving nurse.    [place] Fall Assessment:    Presents to emergency department  because of falls (Syncope, seizure, or loss of consciousness): No  Age > 70: Yes  Altered Mental Status, Intoxication with alcohol or substance confusion (Disorientation, impaired judgment, poor safety awaremess, or inability to follow instructions): Yes  Impaired Mobility: Ambulates or transfers with assistive devices or assistance; Unable to ambulate or transer.: Yes  Nursing Judgement: Yes          Lines:   Peripheral IV 07/15/25 Left Forearm (Active)       Peripheral IV 07/15/25 Right Forearm (Active)        Opportunity for questions and clarification was provided.      Patient transported with:  Sergio Matamoros RN  07/15/25 9118

## 2025-07-16 LAB
ANION GAP SERPL CALC-SCNC: 12 MMOL/L (ref 7–16)
BASOPHILS # BLD: 0.05 K/UL (ref 0–0.2)
BASOPHILS NFR BLD: 0.3 % (ref 0–2)
BUN SERPL-MCNC: 11 MG/DL (ref 8–23)
CALCIUM SERPL-MCNC: 8.2 MG/DL (ref 8.8–10.2)
CHLORIDE SERPL-SCNC: 98 MMOL/L (ref 98–107)
CO2 SERPL-SCNC: 24 MMOL/L (ref 20–29)
CREAT SERPL-MCNC: 0.7 MG/DL (ref 0.6–1.1)
DIFFERENTIAL METHOD BLD: ABNORMAL
EOSINOPHIL # BLD: 0.06 K/UL (ref 0–0.8)
EOSINOPHIL NFR BLD: 0.3 % (ref 0.5–7.8)
ERYTHROCYTE [DISTWIDTH] IN BLOOD BY AUTOMATED COUNT: 13.5 % (ref 11.9–14.6)
GLUCOSE SERPL-MCNC: 117 MG/DL (ref 70–99)
HCT VFR BLD AUTO: 39.4 % (ref 35.8–46.3)
HGB BLD-MCNC: 12.7 G/DL (ref 11.7–15.4)
IMM GRANULOCYTES # BLD AUTO: 0.52 K/UL (ref 0–0.5)
IMM GRANULOCYTES NFR BLD AUTO: 2.7 % (ref 0–5)
LYMPHOCYTES # BLD: 0.65 K/UL (ref 0.5–4.6)
LYMPHOCYTES NFR BLD: 3.4 % (ref 13–44)
MAGNESIUM SERPL-MCNC: 2 MG/DL (ref 1.8–2.4)
MCH RBC QN AUTO: 29.5 PG (ref 26.1–32.9)
MCHC RBC AUTO-ENTMCNC: 32.2 G/DL (ref 31.4–35)
MCV RBC AUTO: 91.6 FL (ref 82–102)
MONOCYTES # BLD: 1.08 K/UL (ref 0.1–1.3)
MONOCYTES NFR BLD: 5.6 % (ref 4–12)
NEUTS SEG # BLD: 16.89 K/UL (ref 1.7–8.2)
NEUTS SEG NFR BLD: 87.7 % (ref 43–78)
NRBC # BLD: 0 K/UL (ref 0–0.2)
PLATELET # BLD AUTO: 311 K/UL (ref 150–450)
PMV BLD AUTO: 9 FL (ref 9.4–12.3)
POTASSIUM SERPL-SCNC: 3.6 MMOL/L (ref 3.5–5.1)
RBC # BLD AUTO: 4.3 M/UL (ref 4.05–5.2)
SODIUM SERPL-SCNC: 134 MMOL/L (ref 136–145)
WBC # BLD AUTO: 19.3 K/UL (ref 4.3–11.1)

## 2025-07-16 PROCEDURE — 97112 NEUROMUSCULAR REEDUCATION: CPT

## 2025-07-16 PROCEDURE — 85025 COMPLETE CBC W/AUTO DIFF WBC: CPT

## 2025-07-16 PROCEDURE — 1100000000 HC RM PRIVATE

## 2025-07-16 PROCEDURE — 83735 ASSAY OF MAGNESIUM: CPT

## 2025-07-16 PROCEDURE — 97165 OT EVAL LOW COMPLEX 30 MIN: CPT

## 2025-07-16 PROCEDURE — 2580000003 HC RX 258: Performed by: INTERNAL MEDICINE

## 2025-07-16 PROCEDURE — 97530 THERAPEUTIC ACTIVITIES: CPT

## 2025-07-16 PROCEDURE — 36415 COLL VENOUS BLD VENIPUNCTURE: CPT

## 2025-07-16 PROCEDURE — 97161 PT EVAL LOW COMPLEX 20 MIN: CPT

## 2025-07-16 PROCEDURE — 80048 BASIC METABOLIC PNL TOTAL CA: CPT

## 2025-07-16 PROCEDURE — 6360000002 HC RX W HCPCS: Performed by: INTERNAL MEDICINE

## 2025-07-16 RX ADMIN — PIPERACILLIN AND TAZOBACTAM 3375 MG: 3; .375 INJECTION, POWDER, FOR SOLUTION INTRAVENOUS at 16:19

## 2025-07-16 RX ADMIN — PIPERACILLIN AND TAZOBACTAM 3375 MG: 3; .375 INJECTION, POWDER, FOR SOLUTION INTRAVENOUS at 00:47

## 2025-07-16 RX ADMIN — PIPERACILLIN AND TAZOBACTAM 3375 MG: 3; .375 INJECTION, POWDER, FOR SOLUTION INTRAVENOUS at 08:51

## 2025-07-16 NOTE — PROGRESS NOTES
ACUTE PHYSICAL THERAPY GOALS:   (Developed with and agreed upon by patient and/or caregiver.)  STG:  (1.)Ms. Puckett will move from supine to sit and sit to supine  with MAXIMAL ASSIST within 1-2 treatment day(s).    (2.)Ms. Puckett will transfer from bed to chair and chair to bed with MAXIMAL ASSIST using the least restrictive device within 1-2 treatment day(s).    LTG:  (1.)Ms. Puckett will move from supine to sit and sit to supine  in bed with MODERATE ASSIST within 3-4 treatment day(s).    (2.)Ms. Puckett will transfer from bed to chair and chair to bed with MODERATE ASSIST using the least restrictive device within 3-4 treatment day(s).      Above goals set considering pt's prior level of function in her home setting. Pt's participation with evaluation was good; progress may be affected by participation and rehab potential_____________________________________________________________________________________________      PHYSICAL THERAPY Initial Assessment, Daily Note, and PM  (Link to Caseload Tracking: PT Visit Days : 1  Acknowledge Orders  Time In/Out  PT Charge Capture  Rehab Caseload Tracker    Liss Puckett is a 74 y.o. female   PRIMARY DIAGNOSIS: Sepsis (HCC)  Sepsis (HCC) [A41.9]       Reason for Referral: Difficulty in walking, Not elsewhere classified (R26.2)  Other abnormalities of gait and mobility (R26.89)  Inpatient: Payor: [facility] MEDICARE / Plan: [employer] DUAL COMPLETE / Product Type: *No Product type* /     ASSESSMENT:     REHAB RECOMMENDATIONS:   Recommendation to date pending progress:  Setting:  Continued physical therapy recommended at discharge.  Appears to be at max functional independence; functional level and placement may be affected by pt participation     Equipment:    To Be Determined     ASSESSMENT:  Ms. Puckett admitted with above diagnosis. Pt supine on arrival. Pt assisted supine to sit. Pt sat edge of bed for some time. Pt unable to perform seated marching but requests therapy stand pt.

## 2025-07-16 NOTE — ED NOTES
[facility] ETA  transferring to [facility] 356: 9 PM     Jessy Hayward  07/15/25 2033

## 2025-07-16 NOTE — PROGRESS NOTES
Hospitalist Progress Note   Admit Date:  7/15/2025  9:59 PM   Name:  iLss Puckett   Age:  74 y.o.  Sex:  female  :  1950   MRN:  [id number]   Room:  [facility]    Presenting/Chief Complaint: No chief complaint on file.     Reason(s) for Admission: Sepsis (HCC) [A41.9]     Hospital Course:   Liss Puckett is a 74 y.o. female with medical history of hypertension, hyperlipidemia, type 2 diabetes, history of recurrent UTIs, prior CVA with right-sided weakness who presented with generalized weakness and malaise for the past 4 5 days.  Patient laying in bed.  Due to prior stroke, patient has dysarthria and therefore difficult to comprehend her speech.  Additional information was obtained from patient's aide, Lauryn Iyer.  Ms. Iyer states that patient complained about dysuria as well as CVA pain past 2 to 3 days.  Due to her symptoms, Ms. Tiwari.1 and brought Azo strips which was positive.  Patient was given the cranberry pills.  Patient continued to complain of malaise as well as dysuria and pain upon wiping.  She reports that patient mainly wheelchair-bound and was sent on a wheelchair 2 to 3 hours daily but mainly in bed most of the time.  Patient is able to move her feet while she is in bed but has right-sided weakness from prior stroke.     In the ED, patient was tachycardic and tachypneic with blood pressure of 78/61 initially and responded to fluids.  Laboratory workup notable for WBC of 24.3, serum bicarb of 18, anion gap of 17, magnesium 1.7, lactic acid of 2.9, troponin of 18.8.  CT scan abdomen pelvis with left-sided pyelonephritis without abscess or hydronephrosis, cystitis with asymmetric increased thickening of the right bladder wall up to 1.2 cm, mild fluid and gas in vagina, mild diffuse colitis.      Subjective & 24hr Events:    - Pt seen initially this am, very sleepy but would wake up and respond to some questions before falling back asleep.  Seen later in afternoon, more awake and

## 2025-07-16 NOTE — ED NOTES
TRANSFER - OUT REPORT:    Verbal report given to Truman DANIELLE on Liss Puckett  being transferred to [facility] Room 356 for routine progression of patient care       Report consisted of patient's Situation, Background, Assessment and   Recommendations(SBAR).     Information from the following report(s) ED Encounter Summary was reviewed with the receiving nurse.    [place] Fall Assessment:    Presents to emergency department  because of falls (Syncope, seizure, or loss of consciousness): No  Age > 70: Yes  Altered Mental Status, Intoxication with alcohol or substance confusion (Disorientation, impaired judgment, poor safety awaremess, or inability to follow instructions): Yes  Impaired Mobility: Ambulates or transfers with assistive devices or assistance; Unable to ambulate or transer.: Yes  Nursing Judgement: Yes          Lines:   Peripheral IV 07/15/25 Left Forearm (Active)       Peripheral IV 07/15/25 Right Forearm (Active)        Opportunity for questions and clarification was provided.      Patient transported with:  [facility] Sergio Mann RN  07/15/25 2015

## 2025-07-16 NOTE — PROGRESS NOTES
ACUTE OCCUPATIONAL THERAPY GOALS:   (Developed with and agreed upon by patient and/or caregiver.)  1. Patient will participate in grooming with MOD A and adaptive equipment as needed in order to decrease caregiver burden.  2. Patient will participate in dressing with MOD A and adaptive equipment as needed in order to decrease caregiver burden.  3. Patient will participate in bathing with MAX A and adaptive equipment as needed in order to decrease caregiver burden.  4. Patient will participate toileting and toilet transfer with MAX A and adaptive equipment as needed in order to decrease caregiver burden.  5. Patient will participate ADL tranfers in room with MAX A and adaptive equipment as needed in order to decrease caregiver burden.  6. Patient/family to demonstrate knowledge of home safety and DME recommendations.    Timeframe: (Trial period -- 2-3 visits)    OCCUPATIONAL THERAPY Initial Assessment, Daily Note, and PM       OT Visit Days: 1  Acknowledge Orders  Time  OT Charge Capture  Rehab Caseload Tracker      Liss Puckett is a 74 y.o. female   PRIMARY DIAGNOSIS: Sepsis (HCC)  Sepsis (HCC) [A41.9]       Reason for Referral: Generalized Muscle Weakness (M62.81)  Other lack of cordination (R27.8)  Difficulty in walking, Not elsewhere classified (R26.2)  Inpatient: Payor: [facility] MEDICARE / Plan: [employer] DUAL COMPLETE / Product Type: *No Product type* /     ASSESSMENT:     REHAB RECOMMENDATIONS:   Recommendation to date pending progress:  Setting:  Continued occupational therapy recommended at discharge    Equipment:    To Be Determined     ASSESSMENT:  Ms. Puckett is admitted for the above diagnosis and presents with overall deficits in strength, functional mobility and ADL performance.  She lives with son in a 1 level home. At baseline, she reports her [facility] aide assists with all of her self care and provides total assistance for supine<>sit and bed<>chair transfers. Her aide is there in the morning through

## 2025-07-16 NOTE — CARE COORDINATION
CASE MANAGEMENT ASSESSMENT NOTE    Patient is a 74 year old female with Sepsis.      RNCM attempted to complete patient assessment at bedside.  Patient presents to assessment assessment very drowsy, not answering assessment questions.  RNCM contacted patient's son on file, to complete assessment.  Son confirms that patient lives at home with him.  States that she typically does not have issues with confusion or memory loss.  Son states he works 2pm-11pm 5 days a week.  Patient has a [employer] caregiver from 930am to 1pm every day except Sunday.  At baseline, she needs assist with most transfers and ADLs.  Has a walker and wheelchair.  Son would like a hospital bed.  Lives in a single story home with ramped entrance.  Patient has [facility] medicare as well as medicaid.  She does not follow up with a PCP regularly.  Son is agreeable to a referral to your health.  Also agreeable to PT/OT evals for recommendations/help to determine discharge disposition.    At this time, care of this patient is ongoing.  PT/OT evals have been ordered and awaiting recommendations.  Case management will continue to follow.  Please notify if there are any changes.     Attending Physician: Sergio Zelaya MD  Admit Problem: Sepsis (HCC) [A41.9]  Date/Time of Admission: 7/15/2025  9:59 PM  Problem List:  Patient Active Problem List   Diagnosis    Osteoporosis    Essential hypertension    Chronic hepatitis C without hepatic coma (HCC)    Anxiety and depression    Coronary artery disease involving native coronary artery of native heart without angina pectoris    Type 2 diabetes mellitus without complication, without long-term current use of insulin (HCC)    Mixed hyperlipidemia    CVD (cardiovascular disease)    Panlobular emphysema (HCC)    Failure to thrive in adult    Moderate protein-calorie malnutrition    Sepsis (HCC)    Pyelonephritis    Colitis          07/16/25 1133   Service Assessment   Patient Orientation Other (see comment)  (Patient
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
## 2025-07-16 NOTE — ED NOTES
[facility] arrived to  pt for transfer to [facility].     Sergio Gan, RN  07/15/25 2977

## 2025-07-17 ENCOUNTER — APPOINTMENT (OUTPATIENT)
Dept: CT IMAGING | Age: 75
DRG: 872 | End: 2025-07-17
Attending: FAMILY MEDICINE
Payer: MEDICARE

## 2025-07-17 LAB
ANION GAP SERPL CALC-SCNC: 13 MMOL/L (ref 7–16)
BUN SERPL-MCNC: 9 MG/DL (ref 8–23)
CALCIUM SERPL-MCNC: 8 MG/DL (ref 8.8–10.2)
CHLORIDE SERPL-SCNC: 96 MMOL/L (ref 98–107)
CO2 SERPL-SCNC: 22 MMOL/L (ref 20–29)
CREAT SERPL-MCNC: 0.54 MG/DL (ref 0.6–1.1)
ERYTHROCYTE [DISTWIDTH] IN BLOOD BY AUTOMATED COUNT: 13.5 % (ref 11.9–14.6)
EST. AVERAGE GLUCOSE BLD GHB EST-MCNC: 134 MG/DL
GLUCOSE BLD STRIP.AUTO-MCNC: 164 MG/DL (ref 65–100)
GLUCOSE BLD STRIP.AUTO-MCNC: 173 MG/DL (ref 65–100)
GLUCOSE SERPL-MCNC: 105 MG/DL (ref 70–99)
HBA1C MFR BLD: 6.3 % (ref 0–5.6)
HCT VFR BLD AUTO: 36.1 % (ref 35.8–46.3)
HGB BLD-MCNC: 11.3 G/DL (ref 11.7–15.4)
MAGNESIUM SERPL-MCNC: 1.8 MG/DL (ref 1.8–2.4)
MCH RBC QN AUTO: 29.7 PG (ref 26.1–32.9)
MCHC RBC AUTO-ENTMCNC: 31.3 G/DL (ref 31.4–35)
MCV RBC AUTO: 95 FL (ref 82–102)
NRBC # BLD: 0 K/UL (ref 0–0.2)
PLATELET # BLD AUTO: 271 K/UL (ref 150–450)
PMV BLD AUTO: 9.4 FL (ref 9.4–12.3)
POTASSIUM SERPL-SCNC: 3.5 MMOL/L (ref 3.5–5.1)
RBC # BLD AUTO: 3.8 M/UL (ref 4.05–5.2)
SERVICE CMNT-IMP: ABNORMAL
SERVICE CMNT-IMP: ABNORMAL
SODIUM SERPL-SCNC: 131 MMOL/L (ref 136–145)
WBC # BLD AUTO: 14.8 K/UL (ref 4.3–11.1)

## 2025-07-17 PROCEDURE — 2580000003 HC RX 258: Performed by: INTERNAL MEDICINE

## 2025-07-17 PROCEDURE — 85027 COMPLETE CBC AUTOMATED: CPT

## 2025-07-17 PROCEDURE — 83036 HEMOGLOBIN GLYCOSYLATED A1C: CPT

## 2025-07-17 PROCEDURE — 6360000002 HC RX W HCPCS: Performed by: INTERNAL MEDICINE

## 2025-07-17 PROCEDURE — 36415 COLL VENOUS BLD VENIPUNCTURE: CPT

## 2025-07-17 PROCEDURE — 74178 CT ABD&PLV WO CNTR FLWD CNTR: CPT

## 2025-07-17 PROCEDURE — 80048 BASIC METABOLIC PNL TOTAL CA: CPT

## 2025-07-17 PROCEDURE — 83735 ASSAY OF MAGNESIUM: CPT

## 2025-07-17 PROCEDURE — 2580000003 HC RX 258: Performed by: NURSE PRACTITIONER

## 2025-07-17 PROCEDURE — 6360000004 HC RX CONTRAST MEDICATION: Performed by: INTERNAL MEDICINE

## 2025-07-17 PROCEDURE — 90792 PSYCH DIAG EVAL W/MED SRVCS: CPT

## 2025-07-17 PROCEDURE — 1100000000 HC RM PRIVATE

## 2025-07-17 PROCEDURE — 6370000000 HC RX 637 (ALT 250 FOR IP): Performed by: NURSE PRACTITIONER

## 2025-07-17 PROCEDURE — 97530 THERAPEUTIC ACTIVITIES: CPT

## 2025-07-17 PROCEDURE — 82962 GLUCOSE BLOOD TEST: CPT

## 2025-07-17 RX ORDER — IOPAMIDOL 755 MG/ML
100 INJECTION, SOLUTION INTRAVASCULAR
Status: COMPLETED | OUTPATIENT
Start: 2025-07-17 | End: 2025-07-17

## 2025-07-17 RX ORDER — INSULIN LISPRO 100 [IU]/ML
0-10 INJECTION, SOLUTION INTRAVENOUS; SUBCUTANEOUS
Status: DISCONTINUED | OUTPATIENT
Start: 2025-07-17 | End: 2025-07-20 | Stop reason: HOSPADM

## 2025-07-17 RX ORDER — ONDANSETRON 4 MG/1
4 TABLET, ORALLY DISINTEGRATING ORAL EVERY 8 HOURS PRN
Status: DISCONTINUED | OUTPATIENT
Start: 2025-07-17 | End: 2025-07-20 | Stop reason: HOSPADM

## 2025-07-17 RX ORDER — DEXTROSE MONOHYDRATE 100 MG/ML
INJECTION, SOLUTION INTRAVENOUS CONTINUOUS PRN
Status: DISCONTINUED | OUTPATIENT
Start: 2025-07-17 | End: 2025-07-20 | Stop reason: HOSPADM

## 2025-07-17 RX ORDER — SODIUM CHLORIDE 9 MG/ML
INJECTION, SOLUTION INTRAVENOUS CONTINUOUS
Status: ACTIVE | OUTPATIENT
Start: 2025-07-17 | End: 2025-07-17

## 2025-07-17 RX ORDER — HYDRALAZINE HYDROCHLORIDE 20 MG/ML
5 INJECTION INTRAMUSCULAR; INTRAVENOUS EVERY 8 HOURS PRN
Status: DISCONTINUED | OUTPATIENT
Start: 2025-07-17 | End: 2025-07-20 | Stop reason: HOSPADM

## 2025-07-17 RX ORDER — OXYCODONE HYDROCHLORIDE 5 MG/1
5 TABLET ORAL EVERY 4 HOURS PRN
Refills: 0 | Status: DISCONTINUED | OUTPATIENT
Start: 2025-07-17 | End: 2025-07-18

## 2025-07-17 RX ORDER — IBUPROFEN 600 MG/1
1 TABLET ORAL PRN
Status: DISCONTINUED | OUTPATIENT
Start: 2025-07-17 | End: 2025-07-20 | Stop reason: HOSPADM

## 2025-07-17 RX ORDER — TRAZODONE HYDROCHLORIDE 50 MG/1
100 TABLET ORAL NIGHTLY
Status: DISCONTINUED | OUTPATIENT
Start: 2025-07-17 | End: 2025-07-18

## 2025-07-17 RX ORDER — ASPIRIN 81 MG/1
81 TABLET ORAL DAILY
Status: DISCONTINUED | OUTPATIENT
Start: 2025-07-17 | End: 2025-07-20 | Stop reason: HOSPADM

## 2025-07-17 RX ORDER — MIRTAZAPINE 15 MG/1
7.5 TABLET, FILM COATED ORAL NIGHTLY
Status: DISCONTINUED | OUTPATIENT
Start: 2025-07-17 | End: 2025-07-20 | Stop reason: HOSPADM

## 2025-07-17 RX ORDER — SODIUM PHOSPHATE, DIBASIC AND SODIUM PHOSPHATE, MONOBASIC 7; 19 G/230ML; G/230ML
1 ENEMA RECTAL AS NEEDED
Status: DISCONTINUED | OUTPATIENT
Start: 2025-07-17 | End: 2025-07-20 | Stop reason: HOSPADM

## 2025-07-17 RX ORDER — DULOXETIN HYDROCHLORIDE 60 MG/1
60 CAPSULE, DELAYED RELEASE ORAL DAILY
Status: DISCONTINUED | OUTPATIENT
Start: 2025-07-17 | End: 2025-07-18

## 2025-07-17 RX ORDER — ATORVASTATIN CALCIUM 40 MG/1
80 TABLET, FILM COATED ORAL DAILY
Status: DISCONTINUED | OUTPATIENT
Start: 2025-07-17 | End: 2025-07-20 | Stop reason: HOSPADM

## 2025-07-17 RX ORDER — LISINOPRIL 5 MG/1
10 TABLET ORAL DAILY
Status: DISCONTINUED | OUTPATIENT
Start: 2025-07-17 | End: 2025-07-18

## 2025-07-17 RX ORDER — ONDANSETRON 2 MG/ML
4 INJECTION INTRAMUSCULAR; INTRAVENOUS EVERY 6 HOURS PRN
Status: DISCONTINUED | OUTPATIENT
Start: 2025-07-17 | End: 2025-07-20 | Stop reason: HOSPADM

## 2025-07-17 RX ORDER — GUAIFENESIN/DEXTROMETHORPHAN 100-10MG/5
10 SYRUP ORAL EVERY 4 HOURS PRN
Status: DISCONTINUED | OUTPATIENT
Start: 2025-07-17 | End: 2025-07-20 | Stop reason: HOSPADM

## 2025-07-17 RX ADMIN — ATORVASTATIN CALCIUM 80 MG: 40 TABLET, FILM COATED ORAL at 09:53

## 2025-07-17 RX ADMIN — MIRTAZAPINE 7.5 MG: 15 TABLET, FILM COATED ORAL at 19:33

## 2025-07-17 RX ADMIN — PIPERACILLIN AND TAZOBACTAM 3375 MG: 3; .375 INJECTION, POWDER, FOR SOLUTION INTRAVENOUS at 01:09

## 2025-07-17 RX ADMIN — PIPERACILLIN AND TAZOBACTAM 3375 MG: 3; .375 INJECTION, POWDER, FOR SOLUTION INTRAVENOUS at 08:55

## 2025-07-17 RX ADMIN — IOPAMIDOL 100 ML: 755 INJECTION, SOLUTION INTRAVENOUS at 23:05

## 2025-07-17 RX ADMIN — ASPIRIN 81 MG: 81 TABLET, COATED ORAL at 09:53

## 2025-07-17 RX ADMIN — DULOXETINE HYDROCHLORIDE 60 MG: 60 CAPSULE, DELAYED RELEASE ORAL at 09:53

## 2025-07-17 RX ADMIN — PIPERACILLIN AND TAZOBACTAM 3375 MG: 3; .375 INJECTION, POWDER, FOR SOLUTION INTRAVENOUS at 16:09

## 2025-07-17 RX ADMIN — SODIUM CHLORIDE: 0.9 INJECTION, SOLUTION INTRAVENOUS at 09:55

## 2025-07-17 NOTE — PROGRESS NOTES
ACUTE PHYSICAL THERAPY GOALS:   (Developed with and agreed upon by patient and/or caregiver.)  STG:  (1.)Ms. Puckett will move from supine to sit and sit to supine  with MAXIMAL ASSIST within 1-2 treatment day(s).    (2.)Ms. Puckett will transfer from bed to chair and chair to bed with MAXIMAL ASSIST using the least restrictive device within 1-2 treatment day(s).    LTG:  (1.)Ms. Puckett will move from supine to sit and sit to supine  in bed with MODERATE ASSIST within 3-4 treatment day(s).    (2.)Ms. Puckett will transfer from bed to chair and chair to bed with MODERATE ASSIST using the least restrictive device within 3-4 treatment day(s).      Above goals set considering pt's prior level of function in her home setting. Pt's participation with evaluation was good; progress may be affected by participation and rehab potential_____________________________________________________________________________________________      PHYSICAL THERAPY Daily Note and AM  (Link to Caseload Tracking: PT Visit Days : 2  Acknowledge Orders  Time In/Out  PT Charge Capture  Rehab Caseload Tracker    Liss Puckett is a 74 y.o. female   PRIMARY DIAGNOSIS: Sepsis (HCC)  Sepsis (HCC) [A41.9]       Reason for Referral: Difficulty in walking, Not elsewhere classified (R26.2)  Other abnormalities of gait and mobility (R26.89)  Inpatient: Payor: [facility] MEDICARE / Plan: [employer] DUAL COMPLETE / Product Type: *No Product type* /     ASSESSMENT:     REHAB RECOMMENDATIONS:   Recommendation to date pending progress:  Setting:  Short-term Rehab    Equipment:    To Be Determined     ASSESSMENT:  Ms. Puckett continues to require max A for all mobility, including maintaining sitting balance EOB, which appears to be below her functional baseline, as she was able to transfer to  during the day.  Today, she initially was eager to attempt OOB mobility, but once sitting EOB she requested to return to supine.  Encouraged pt to continue working on core control in

## 2025-07-17 NOTE — PLAN OF CARE
Problem: Safety - Adult  Goal: Free from fall injury  7/17/2025 1122 by Sharan Cline RN  Outcome: Progressing  7/16/2025 2246 by Yolande Sam RN  Outcome: Progressing     Problem: Pain  Goal: Verbalizes/displays adequate comfort level or baseline comfort level  7/17/2025 1122 by Sharan Cline RN  Outcome: Progressing  7/16/2025 2246 by Yolande Sam RN  Outcome: Progressing

## 2025-07-17 NOTE — CARE COORDINATION
RNCM was notified that case was accepted by APS.  Patient will also have a psych eval today.  Referral for hospital bed was sent to [facility].  Also made referral to your health to establish in home primary care services.    New PT recommendation is for STR.  CM to follow and assist with placement.

## 2025-07-17 NOTE — PROGRESS NOTES
Hospitalist Progress Note   Admit Date:  7/15/2025  9:59 PM   Name:  Liss Puckett   Age:  74 y.o.  Sex:  female  :  1950   MRN:  [id number]   Room:  [facility]    Presenting/Chief Complaint: No chief complaint on file.     Reason(s) for Admission: Sepsis (HCC) [A41.9]     Hospital Course:   Liss Puckett is a 74 y.o. female with medical history of hypertension, hyperlipidemia, type 2 diabetes, history of recurrent UTIs, prior CVA with right-sided weakness who presented with generalized weakness and malaise for the past 4 5 days.  Patient laying in bed.  Due to prior stroke, patient has dysarthria and therefore difficult to comprehend her speech.  Additional information was obtained from patient's aide, Lauryn Iyer.  Ms. Iyer states that patient complained about dysuria as well as CVA pain past 2 to 3 days.  Due to her symptoms, Ms. Tiwari.1 and brought Azo strips which was positive.  Patient was given the cranberry pills.  Patient continued to complain of malaise as well as dysuria and pain upon wiping.  She reports that patient mainly wheelchair-bound and was sent on a wheelchair 2 to 3 hours daily but mainly in bed most of the time.  Patient is able to move her feet while she is in bed but has right-sided weakness from prior stroke.     In the ED, patient was tachycardic and tachypneic with blood pressure of 78/61 initially and responded to fluids.  Laboratory workup notable for WBC of 24.3, serum bicarb of 18, anion gap of 17, magnesium 1.7, lactic acid of 2.9, troponin of 18.8.  CT scan abdomen pelvis with left-sided pyelonephritis without abscess or hydronephrosis, cystitis with asymmetric increased thickening of the right bladder wall up to 1.2 cm, mild fluid and gas in vagina, mild diffuse colitis.      Subjective & 24hr Events:   Pt resting in bed as I enter room, states she is feeling like crap today, when I ask her why she states bc her breakfast is getting cold and no one is feeding her. I asked

## 2025-07-18 PROBLEM — E87.6 HYPOKALEMIA: Status: ACTIVE | Noted: 2025-07-18

## 2025-07-18 PROBLEM — R53.1 RIGHT SIDED WEAKNESS: Status: ACTIVE | Noted: 2025-07-18

## 2025-07-18 PROBLEM — R33.9 URINE RETENTION: Status: ACTIVE | Noted: 2025-07-18

## 2025-07-18 PROBLEM — N30.90 CYSTITIS: Status: ACTIVE | Noted: 2025-07-18

## 2025-07-18 PROBLEM — I63.9 CVA (CEREBRAL VASCULAR ACCIDENT) (HCC): Status: ACTIVE | Noted: 2025-07-18

## 2025-07-18 PROBLEM — E83.42 HYPOMAGNESEMIA: Status: ACTIVE | Noted: 2025-07-18

## 2025-07-18 PROBLEM — R45.4 IRRITABILITY: Status: ACTIVE | Noted: 2025-07-18

## 2025-07-18 LAB
ALBUMIN SERPL-MCNC: 2 G/DL (ref 3.2–4.6)
ALBUMIN/GLOB SERPL: 0.6 (ref 1–1.9)
ALP SERPL-CCNC: 70 U/L (ref 35–104)
ALT SERPL-CCNC: 37 U/L (ref 8–45)
ANION GAP SERPL CALC-SCNC: 10 MMOL/L (ref 7–16)
APPEARANCE UR: ABNORMAL
AST SERPL-CCNC: 55 U/L (ref 15–37)
BACTERIA URNS QL MICRO: ABNORMAL /HPF
BASOPHILS # BLD: 0.04 K/UL (ref 0–0.2)
BASOPHILS NFR BLD: 0.3 % (ref 0–2)
BILIRUB SERPL-MCNC: 0.4 MG/DL (ref 0–1.2)
BILIRUB UR QL: NEGATIVE
BUN SERPL-MCNC: 6 MG/DL (ref 8–23)
CALCIUM SERPL-MCNC: 8 MG/DL (ref 8.8–10.2)
CASTS URNS QL MICRO: 0 /LPF
CHLORIDE SERPL-SCNC: 100 MMOL/L (ref 98–107)
CO2 SERPL-SCNC: 25 MMOL/L (ref 20–29)
COLOR UR: ABNORMAL
CREAT SERPL-MCNC: 0.47 MG/DL (ref 0.6–1.1)
CRYSTALS URNS QL MICRO: 0 /LPF
DIFFERENTIAL METHOD BLD: ABNORMAL
EOSINOPHIL # BLD: 0.02 K/UL (ref 0–0.8)
EOSINOPHIL NFR BLD: 0.2 % (ref 0.5–7.8)
EPI CELLS #/AREA URNS HPF: ABNORMAL /HPF
ERYTHROCYTE [DISTWIDTH] IN BLOOD BY AUTOMATED COUNT: 13.2 % (ref 11.9–14.6)
GLOBULIN SER CALC-MCNC: 3.5 G/DL (ref 2.3–3.5)
GLUCOSE BLD STRIP.AUTO-MCNC: 117 MG/DL (ref 65–100)
GLUCOSE BLD STRIP.AUTO-MCNC: 123 MG/DL (ref 65–100)
GLUCOSE BLD STRIP.AUTO-MCNC: 188 MG/DL (ref 65–100)
GLUCOSE BLD STRIP.AUTO-MCNC: 190 MG/DL (ref 65–100)
GLUCOSE SERPL-MCNC: 133 MG/DL (ref 70–99)
GLUCOSE UR STRIP.AUTO-MCNC: 500 MG/DL
HCT VFR BLD AUTO: 38.3 % (ref 35.8–46.3)
HGB BLD-MCNC: 12.4 G/DL (ref 11.7–15.4)
HGB UR QL STRIP: ABNORMAL
HYALINE CASTS URNS QL MICRO: ABNORMAL /LPF
IMM GRANULOCYTES # BLD AUTO: 0.21 K/UL (ref 0–0.5)
IMM GRANULOCYTES NFR BLD AUTO: 1.6 % (ref 0–5)
KETONES UR QL STRIP.AUTO: NEGATIVE MG/DL
LEUKOCYTE ESTERASE UR QL STRIP.AUTO: ABNORMAL
LYMPHOCYTES # BLD: 0.88 K/UL (ref 0.5–4.6)
LYMPHOCYTES NFR BLD: 6.9 % (ref 13–44)
MAGNESIUM SERPL-MCNC: 1.7 MG/DL (ref 1.8–2.4)
MCH RBC QN AUTO: 29.5 PG (ref 26.1–32.9)
MCHC RBC AUTO-ENTMCNC: 32.4 G/DL (ref 31.4–35)
MCV RBC AUTO: 91.2 FL (ref 82–102)
MONOCYTES # BLD: 1 K/UL (ref 0.1–1.3)
MONOCYTES NFR BLD: 7.8 % (ref 4–12)
MUCOUS THREADS URNS QL MICRO: 0 /LPF
NEUTS SEG # BLD: 10.6 K/UL (ref 1.7–8.2)
NEUTS SEG NFR BLD: 83.2 % (ref 43–78)
NITRITE UR QL STRIP.AUTO: NEGATIVE
NRBC # BLD: 0 K/UL (ref 0–0.2)
PH UR STRIP: 6.5 (ref 5–9)
PLATELET # BLD AUTO: 353 K/UL (ref 150–450)
PMV BLD AUTO: 9 FL (ref 9.4–12.3)
POTASSIUM SERPL-SCNC: 3.4 MMOL/L (ref 3.5–5.1)
PROT SERPL-MCNC: 5.5 G/DL (ref 6.3–8.2)
PROT UR STRIP-MCNC: ABNORMAL MG/DL
RBC # BLD AUTO: 4.2 M/UL (ref 4.05–5.2)
RBC #/AREA URNS HPF: ABNORMAL /HPF
SERVICE CMNT-IMP: ABNORMAL
SODIUM SERPL-SCNC: 135 MMOL/L (ref 136–145)
SP GR UR REFRACTOMETRY: 1.02 (ref 1–1.02)
URINE CULTURE IF INDICATED: ABNORMAL
UROBILINOGEN UR QL STRIP.AUTO: 0.2 EU/DL (ref 0.2–1)
WBC # BLD AUTO: 12.8 K/UL (ref 4.3–11.1)
WBC URNS QL MICRO: >100 /HPF

## 2025-07-18 PROCEDURE — 87086 URINE CULTURE/COLONY COUNT: CPT

## 2025-07-18 PROCEDURE — 6360000002 HC RX W HCPCS: Performed by: NURSE PRACTITIONER

## 2025-07-18 PROCEDURE — 85025 COMPLETE CBC W/AUTO DIFF WBC: CPT

## 2025-07-18 PROCEDURE — 83735 ASSAY OF MAGNESIUM: CPT

## 2025-07-18 PROCEDURE — 6370000000 HC RX 637 (ALT 250 FOR IP): Performed by: HOSPITALIST

## 2025-07-18 PROCEDURE — 1100000000 HC RM PRIVATE

## 2025-07-18 PROCEDURE — 81001 URINALYSIS AUTO W/SCOPE: CPT

## 2025-07-18 PROCEDURE — 80053 COMPREHEN METABOLIC PANEL: CPT

## 2025-07-18 PROCEDURE — 6370000000 HC RX 637 (ALT 250 FOR IP): Performed by: NURSE PRACTITIONER

## 2025-07-18 PROCEDURE — 82962 GLUCOSE BLOOD TEST: CPT

## 2025-07-18 PROCEDURE — 2580000003 HC RX 258: Performed by: INTERNAL MEDICINE

## 2025-07-18 PROCEDURE — 36415 COLL VENOUS BLD VENIPUNCTURE: CPT

## 2025-07-18 PROCEDURE — 6360000002 HC RX W HCPCS: Performed by: INTERNAL MEDICINE

## 2025-07-18 RX ORDER — TAMSULOSIN HYDROCHLORIDE 0.4 MG/1
0.4 CAPSULE ORAL DAILY
Status: DISCONTINUED | OUTPATIENT
Start: 2025-07-18 | End: 2025-07-20 | Stop reason: HOSPADM

## 2025-07-18 RX ORDER — POTASSIUM CHLORIDE 1500 MG/1
40 TABLET, EXTENDED RELEASE ORAL 2 TIMES DAILY
Status: COMPLETED | OUTPATIENT
Start: 2025-07-18 | End: 2025-07-18

## 2025-07-18 RX ORDER — OXYCODONE HYDROCHLORIDE 5 MG/1
5 TABLET ORAL EVERY 6 HOURS PRN
Refills: 0 | Status: DISCONTINUED | OUTPATIENT
Start: 2025-07-18 | End: 2025-07-20 | Stop reason: HOSPADM

## 2025-07-18 RX ORDER — MAGNESIUM SULFATE IN WATER 40 MG/ML
2000 INJECTION, SOLUTION INTRAVENOUS ONCE
Status: COMPLETED | OUTPATIENT
Start: 2025-07-18 | End: 2025-07-18

## 2025-07-18 RX ORDER — DULOXETIN HYDROCHLORIDE 30 MG/1
30 CAPSULE, DELAYED RELEASE ORAL DAILY
Status: DISCONTINUED | OUTPATIENT
Start: 2025-07-19 | End: 2025-07-20 | Stop reason: HOSPADM

## 2025-07-18 RX ORDER — LANOLIN ALCOHOL/MO/W.PET/CERES
400 CREAM (GRAM) TOPICAL DAILY
Status: DISCONTINUED | OUTPATIENT
Start: 2025-07-18 | End: 2025-07-20 | Stop reason: HOSPADM

## 2025-07-18 RX ADMIN — PIPERACILLIN AND TAZOBACTAM 3375 MG: 3; .375 INJECTION, POWDER, FOR SOLUTION INTRAVENOUS at 09:53

## 2025-07-18 RX ADMIN — ASPIRIN 81 MG: 81 TABLET, COATED ORAL at 09:57

## 2025-07-18 RX ADMIN — Medication 400 MG: at 11:58

## 2025-07-18 RX ADMIN — INSULIN LISPRO 3 UNITS: 100 INJECTION, SOLUTION INTRAVENOUS; SUBCUTANEOUS at 21:42

## 2025-07-18 RX ADMIN — ATORVASTATIN CALCIUM 80 MG: 40 TABLET, FILM COATED ORAL at 09:57

## 2025-07-18 RX ADMIN — INSULIN LISPRO 3 UNITS: 100 INJECTION, SOLUTION INTRAVENOUS; SUBCUTANEOUS at 11:58

## 2025-07-18 RX ADMIN — PIPERACILLIN AND TAZOBACTAM 3375 MG: 3; .375 INJECTION, POWDER, FOR SOLUTION INTRAVENOUS at 01:04

## 2025-07-18 RX ADMIN — PIPERACILLIN AND TAZOBACTAM 3375 MG: 3; .375 INJECTION, POWDER, FOR SOLUTION INTRAVENOUS at 15:22

## 2025-07-18 RX ADMIN — MAGNESIUM SULFATE HEPTAHYDRATE 2000 MG: 40 INJECTION, SOLUTION INTRAVENOUS at 09:57

## 2025-07-18 RX ADMIN — POTASSIUM CHLORIDE 40 MEQ: 1500 TABLET, EXTENDED RELEASE ORAL at 21:43

## 2025-07-18 RX ADMIN — TAMSULOSIN HYDROCHLORIDE 0.4 MG: 0.4 CAPSULE ORAL at 11:58

## 2025-07-18 RX ADMIN — MIRTAZAPINE 7.5 MG: 15 TABLET, FILM COATED ORAL at 21:43

## 2025-07-18 RX ADMIN — DULOXETINE HYDROCHLORIDE 60 MG: 60 CAPSULE, DELAYED RELEASE ORAL at 09:57

## 2025-07-18 RX ADMIN — POTASSIUM CHLORIDE 40 MEQ: 1500 TABLET, EXTENDED RELEASE ORAL at 09:57

## 2025-07-18 NOTE — PROGRESS NOTES
Order to straight cath. This RN attempted straight cath however stool was returned, catheter not in rectum. Winston called to bedside to assist in catheterization. Notified MD and order placed for CT with and without contrast to rule out fistula.

## 2025-07-18 NOTE — PLAN OF CARE
Problem: Chronic Conditions and Co-morbidities  Goal: Patient's chronic conditions and co-morbidity symptoms are monitored and maintained or improved  7/18/2025 1054 by Gokul Solorzano RN  Outcome: Progressing  7/18/2025 0156 by Yolande Sam RN  Outcome: Progressing     Problem: Discharge Planning  Goal: Discharge to home or other facility with appropriate resources  7/18/2025 1054 by Gokul Solorzano RN  Outcome: Progressing  7/18/2025 0156 by Yolande Sam RN  Outcome: Progressing     Problem: Skin/Tissue Integrity  Goal: Skin integrity remains intact  Description: 1.  Monitor for areas of redness and/or skin breakdown  2.  Assess vascular access sites hourly  3.  Every 4-6 hours minimum:  Change oxygen saturation probe site  4.  Every 4-6 hours:  If on nasal continuous positive airway pressure, respiratory therapy assess nares and determine need for appliance change or resting period  7/18/2025 1054 by Gokul Solorzano RN  Outcome: Progressing  7/18/2025 0156 by Yolande Sam RN  Outcome: Progressing     Problem: Safety - Adult  Goal: Free from fall injury  7/18/2025 1054 by Gokul Solorzano RN  Outcome: Progressing  7/18/2025 0156 by Yolande Sam RN  Outcome: Progressing     Problem: Pain  Goal: Verbalizes/displays adequate comfort level or baseline comfort level  7/18/2025 1054 by Gokul Solorzano RN  Outcome: Progressing  7/18/2025 0156 by Yolande Sam RN  Outcome: Progressing

## 2025-07-18 NOTE — PROGRESS NOTES
Hospitalist Progress Note   Admit Date:  7/15/2025  9:59 PM   Name:  Liss Puckett   Age:  74 y.o.  Sex:  female  :  1950   MRN:  [id number]   Room:  [facility]    Presenting/Chief Complaint: No chief complaint on file.     Reason(s) for Admission: Sepsis (HCC) [A41.9]     Hospital Course:   Liss Puckett is a 74 y.o. female with medical history of hypertension, hyperlipidemia, type 2 diabetes, history of recurrent UTIs, prior CVA with right-sided weakness who presented with generalized weakness and malaise for the past 4 5 days.  Patient laying in bed.  Due to prior stroke, patient has dysarthria and therefore difficult to comprehend her speech.  Additional information was obtained from patient's aide, Lauryn Iyer.  Ms. Iyer states that patient complained about dysuria as well as CVA pain past 2 to 3 days.  Due to her symptoms, Ms. Tiwari.1 and brought Azo strips which was positive.  Patient was given the cranberry pills.  Patient continued to complain of malaise as well as dysuria and pain upon wiping.  She reports that patient mainly wheelchair-bound and was sent on a wheelchair 2 to 3 hours daily but mainly in bed most of the time.  Patient is able to move her feet while she is in bed but has right-sided weakness from prior stroke.     In the ED, patient was tachycardic and tachypneic with blood pressure of 78/61 initially and responded to fluids.  Laboratory workup notable for WBC of 24.3, serum bicarb of 18, anion gap of 17, magnesium 1.7, lactic acid of 2.9, troponin of 18.8.  CT scan abdomen pelvis with left-sided pyelonephritis without abscess or hydronephrosis, cystitis with asymmetric increased thickening of the right bladder wall up to 1.2 cm, mild fluid and gas in vagina, mild diffuse colitis.      Subjective & 24hr Events:   Pt resting in bed as I enter room, she is calm today, accepting of care and smiling / friendly. States feeling better today than she was yesterday.   Patient is afebrile.

## 2025-07-18 NOTE — PROGRESS NOTES
SPIRITUAL HEALTH  Note for Med/Surg Visit                  Room # 352/01    Name: Liss Puckett           Age: 74 y.o.    Gender: female          MRN: [id number]  Sabianist: Lutheran       Preferred Language: English    Date: 07/18/25  Visit Time: Begin Time: (P) 0845 End Time : (P) 0850 Complexity of Encounter: (P) Low      Visit Summary:  offered spiritual care visit with patient. Patient declined a visit at this time.  is available for follow up at patient's request.       Referral/Consult From: (P) Rounding  Encounter Overview/Reason: (P) Spiritual/Emotional Needs  Encounter Code:     Crisis (if applicable):    Service Provided For: (P) Patient     Patient was available.    Shanelle, Belief, Meaning:   Patient unable to assess at this time  Family/Friends   Rituals (if applicable)      Importance and Influence:  Patient unable to assess at this time  Family/Friends     Community:  Patient   unable to assess at this time   Family/Friends       Assessment and Plan of Care:   Emotions Expressed by Patient:   Assessment: (P) Unable to assess    Interventions by :   Intervention: (P) Sustaining Presence/Ministry of presence     Result/ Response by Patient:   Outcome: (P) Refused/Declined    Patient Plan of Care:   Plan and Referrals  Plan/Referrals: (P) Continue Support (comment)     Electronically signed by DAVID Melissa, on 7/18/2025 at 9:26 AM.  Spiritual Health  [facility]  [phone]

## 2025-07-18 NOTE — CONSULTS
acceptance of foods) as evidenced by patient reported barriers (RD observation)  Moderate malnutrition, in context of chronic illness related to cognitive or neurological impairment, decreased appetite as evidenced by criteria as identified in malnutrition assessment    Nutrition Goal(s):      Active Goal: Meet at least 75% of estimated needs, by next RD assessment       Discharge Planning:    Too soon to determine    MONICA PEARSON, RD    
normal  Impulsive behavior Yes  Speech    loud and interrupting  Mood    Irritability  Affect    labile affect Congruent to thought content and mood  Thought Content    embellishment and all or nothing thinking, no delusions voiced  Thought Process    linear   Associations    logical connections  Perceptions: denies AH/VH, does not appear preoccupied with the internal environment, no delusions  Insight    Fair  Judgment    Intact  Orientation    oriented to person, place, time, and general circumstances  Memory    recent and remote memory intact  Attention/Concentration    intact  Ability to understand instructions Yes  Ability to respond meaningfully Yes  SI:   no suicidal ideation  HI: Denies HI      No notes of this type exist for this encounter.             7/15/2025    11:10 AM   C-SSRS Suicide Screening   1) Within the past month, have you wished you were dead or wished you could go to sleep and not wake up?  No   2) Have you actually had any thoughts of killing yourself?  No   6) Have you ever done anything, started to do anything, or prepared to do anything to end your life? No          Questionnaire Findings:    PHQ9:    GAD7:        No data to display                  ASSESSMENT  Psychiatric Diagnoses:  Anxiety and depression [F41.9, F32.A], Irritability [R45.4]       Legal Status: voluntary      Suicide Risk Assessment: denies current suicidal ideation, plan and intent    Risk Assessment:  Protective Factors:  Protective: Denies depression, Denies suicidal ideation, Patient is verbally jimmie for safety, Patient has social or family support, Compliant with recommended medications, and Patient is future oriented   Risk Factors:  Risk Factors: Highly impulsive behaviors and Chronic pain or medical illness  Overall Level Suicide Risk: low risk    C-SSRS Score       7/15/2025    11:10 AM   C-SSRS Suicide Screening   1) Within the past month, have you wished you were dead or wished you could go to sleep

## 2025-07-18 NOTE — CARE COORDINATION
Chart reviewed by RNCM and discussed in IDR     CM following for continued stay.     Patient will likely be DC ready tomorrow.    APS case was made regarding concerns of patient's home situation (see previous notes).    PT recommended STR but patient is adamant about going home.  She is decisional.    Home Health arranged through [facility].  RNCM arranged for in home primary care services with [facility].  Referral for hospital bed sent to [facility].    RNCM updated son and he understands and is in agreement with DC plan.    [place] medical transport at [place].  Son states best time to arranged transport tomorrow would be between 8am-1pm     Please consult case management if any additional discharge needs arise.

## 2025-07-18 NOTE — PROGRESS NOTES
Physical therapy - attempted with encouragement and she continued to refuse.  She says she is going home tomorrow with her son.

## 2025-07-19 LAB
ANION GAP SERPL CALC-SCNC: 8 MMOL/L (ref 7–16)
BUN SERPL-MCNC: 5 MG/DL (ref 8–23)
CALCIUM SERPL-MCNC: 8.3 MG/DL (ref 8.8–10.2)
CHLORIDE SERPL-SCNC: 103 MMOL/L (ref 98–107)
CO2 SERPL-SCNC: 27 MMOL/L (ref 20–29)
CREAT SERPL-MCNC: 0.55 MG/DL (ref 0.6–1.1)
GLUCOSE BLD STRIP.AUTO-MCNC: 118 MG/DL (ref 65–100)
GLUCOSE BLD STRIP.AUTO-MCNC: 152 MG/DL (ref 65–100)
GLUCOSE BLD STRIP.AUTO-MCNC: 160 MG/DL (ref 65–100)
GLUCOSE BLD STRIP.AUTO-MCNC: 169 MG/DL (ref 65–100)
GLUCOSE SERPL-MCNC: 139 MG/DL (ref 70–99)
MAGNESIUM SERPL-MCNC: 2.1 MG/DL (ref 1.8–2.4)
POTASSIUM SERPL-SCNC: 5 MMOL/L (ref 3.5–5.1)
SERVICE CMNT-IMP: ABNORMAL
SODIUM SERPL-SCNC: 138 MMOL/L (ref 136–145)

## 2025-07-19 PROCEDURE — 83735 ASSAY OF MAGNESIUM: CPT

## 2025-07-19 PROCEDURE — 6370000000 HC RX 637 (ALT 250 FOR IP): Performed by: HOSPITALIST

## 2025-07-19 PROCEDURE — 36415 COLL VENOUS BLD VENIPUNCTURE: CPT

## 2025-07-19 PROCEDURE — 80048 BASIC METABOLIC PNL TOTAL CA: CPT

## 2025-07-19 PROCEDURE — 2580000003 HC RX 258: Performed by: INTERNAL MEDICINE

## 2025-07-19 PROCEDURE — 6360000002 HC RX W HCPCS: Performed by: INTERNAL MEDICINE

## 2025-07-19 PROCEDURE — 6370000000 HC RX 637 (ALT 250 FOR IP): Performed by: NURSE PRACTITIONER

## 2025-07-19 PROCEDURE — 1100000000 HC RM PRIVATE

## 2025-07-19 PROCEDURE — 82962 GLUCOSE BLOOD TEST: CPT

## 2025-07-19 RX ORDER — OXYCODONE HYDROCHLORIDE 5 MG/1
5 TABLET ORAL EVERY 6 HOURS PRN
Qty: 8 TABLET | Refills: 0 | Status: SHIPPED | OUTPATIENT
Start: 2025-07-19 | End: 2025-07-21

## 2025-07-19 RX ORDER — CIPROFLOXACIN 500 MG/1
500 TABLET, FILM COATED ORAL 2 TIMES DAILY
Qty: 14 TABLET | Refills: 0 | Status: SHIPPED | OUTPATIENT
Start: 2025-07-19 | End: 2025-07-26

## 2025-07-19 RX ORDER — METRONIDAZOLE 500 MG/1
500 TABLET ORAL 2 TIMES DAILY
Qty: 14 TABLET | Refills: 0 | Status: SHIPPED | OUTPATIENT
Start: 2025-07-19 | End: 2025-07-26

## 2025-07-19 RX ADMIN — Medication 400 MG: at 07:48

## 2025-07-19 RX ADMIN — PIPERACILLIN AND TAZOBACTAM 3375 MG: 3; .375 INJECTION, POWDER, FOR SOLUTION INTRAVENOUS at 15:54

## 2025-07-19 RX ADMIN — ASPIRIN 81 MG: 81 TABLET, COATED ORAL at 07:47

## 2025-07-19 RX ADMIN — PIPERACILLIN AND TAZOBACTAM 3375 MG: 3; .375 INJECTION, POWDER, FOR SOLUTION INTRAVENOUS at 07:53

## 2025-07-19 RX ADMIN — ATORVASTATIN CALCIUM 80 MG: 40 TABLET, FILM COATED ORAL at 07:48

## 2025-07-19 RX ADMIN — MIRTAZAPINE 7.5 MG: 15 TABLET, FILM COATED ORAL at 21:44

## 2025-07-19 RX ADMIN — DULOXETINE HYDROCHLORIDE 30 MG: 30 CAPSULE, DELAYED RELEASE ORAL at 07:48

## 2025-07-19 RX ADMIN — PIPERACILLIN AND TAZOBACTAM 3375 MG: 3; .375 INJECTION, POWDER, FOR SOLUTION INTRAVENOUS at 00:05

## 2025-07-19 NOTE — PROGRESS NOTES
Occupational Therapy Note:    Attempted to see patient this AM for occupational therapy treatment  session. Patient supine in bed eating her pudding with a spoon. She repeatedly said\"I want to go home\". She declined working with therapy, she declined bathing or gown change. Will follow and re-attempt as schedule permits/patient available. Thank you,    Rashida Patterson, OT    Rehab Casel[facility] Tracker

## 2025-07-19 NOTE — CARE COORDINATION
RNCM spoke with son of patient, Victor Hugo. Victor Hugo states he will not be available to receive patient, as he is going to work now. RNCM asked if there would be any care providers available for the patient. He states there is no one available to care for the patient at the residence.     RNCM inquired about transportation tomorrow. Victor Hugo states that tomorrow will be the same as today. He will be home in the morning, but there will be no care providers in the afternoon. The patient does have a CNA every day except for Sundays.     RNCM notified MD regarding this. Case management will continue to follow until discharge. Please notify of any changes.

## 2025-07-19 NOTE — PLAN OF CARE
Problem: Chronic Conditions and Co-morbidities  Goal: Patient's chronic conditions and co-morbidity symptoms are monitored and maintained or improved  7/19/2025 1002 by Ness Suero RN  Outcome: Progressing  7/19/2025 0454 by Humble Fischer RN  Outcome: Progressing     Problem: Discharge Planning  Goal: Discharge to home or other facility with appropriate resources  7/19/2025 1002 by Ness Suero RN  Outcome: Progressing  7/19/2025 0454 by Humble Fischer RN  Outcome: Progressing     Problem: Skin/Tissue Integrity  Goal: Skin integrity remains intact  Description: 1.  Monitor for areas of redness and/or skin breakdown  2.  Assess vascular access sites hourly  3.  Every 4-6 hours minimum:  Change oxygen saturation probe site  4.  Every 4-6 hours:  If on nasal continuous positive airway pressure, respiratory therapy assess nares and determine need for appliance change or resting period  7/19/2025 1002 by Ness Suero RN  Outcome: Progressing  7/19/2025 0454 by Humble Fischer RN  Outcome: Progressing     Problem: Safety - Adult  Goal: Free from fall injury  7/19/2025 1002 by Ness Suero RN  Outcome: Progressing  7/19/2025 0454 by Humble Fischer RN  Outcome: Progressing     Problem: Pain  Goal: Verbalizes/displays adequate comfort level or baseline comfort level  7/19/2025 1002 by Ness Suero RN  Outcome: Progressing  7/19/2025 0454 by Humble Fischer RN  Outcome: Progressing

## 2025-07-19 NOTE — PROGRESS NOTES
Hospitalist Progress Note   Admit Date:  7/15/2025  9:59 PM   Name:  Liss Puckett   Age:  74 y.o.  Sex:  female  :  1950   MRN:  [id number]   Room:  [facility]    Presenting/Chief Complaint: No chief complaint on file.     Reason(s) for Admission: Sepsis (HCC) [A41.9]     Hospital Course:   Liss Puckett is a 74 y.o. female with medical history of hypertension, hyperlipidemia, type 2 diabetes, history of recurrent UTIs, prior CVA with right-sided weakness who presented with generalized weakness and malaise for the past 4 5 days.  Patient laying in bed.  Due to prior stroke, patient has dysarthria and therefore difficult to comprehend her speech.  Additional information was obtained from patient's aide, Lauryn Iyer.  Ms. Iyer states that patient complained about dysuria as well as CVA pain past 2 to 3 days.  Due to her symptoms, Ms. Tiwari.1 and brought Azo strips which was positive.  Patient was given the cranberry pills.  Patient continued to complain of malaise as well as dysuria and pain upon wiping.  She reports that patient mainly wheelchair-bound and was sent on a wheelchair 2 to 3 hours daily but mainly in bed most of the time.  Patient is able to move her feet while she is in bed but has right-sided weakness from prior stroke.     In the ED, patient was tachycardic and tachypneic with blood pressure of 78/61 initially and responded to fluids.  Laboratory workup notable for WBC of 24.3, serum bicarb of 18, anion gap of 17, magnesium 1.7, lactic acid of 2.9, troponin of 18.8.  CT scan abdomen pelvis with left-sided pyelonephritis without abscess or hydronephrosis, cystitis with asymmetric increased thickening of the right bladder wall up to 1.2 cm, mild fluid and gas in vagina, mild diffuse colitis.    Subjective & 24hr Events:   Pt resting in bed as I enter room, states she is feeling like crap today, when I ask her why she states bc her breakfast is getting cold and no one is feeding her. I asked if

## 2025-07-20 VITALS
OXYGEN SATURATION: 93 % | HEART RATE: 89 BPM | HEIGHT: 67 IN | DIASTOLIC BLOOD PRESSURE: 85 MMHG | TEMPERATURE: 97.3 F | BODY MASS INDEX: 17.22 KG/M2 | SYSTOLIC BLOOD PRESSURE: 127 MMHG | RESPIRATION RATE: 16 BRPM

## 2025-07-20 LAB
BACTERIA SPEC CULT: NORMAL
GLUCOSE BLD STRIP.AUTO-MCNC: 163 MG/DL (ref 65–100)
SERVICE CMNT-IMP: ABNORMAL
SERVICE CMNT-IMP: NORMAL

## 2025-07-20 PROCEDURE — 82962 GLUCOSE BLOOD TEST: CPT

## 2025-07-20 PROCEDURE — 2580000003 HC RX 258: Performed by: INTERNAL MEDICINE

## 2025-07-20 PROCEDURE — 6370000000 HC RX 637 (ALT 250 FOR IP): Performed by: HOSPITALIST

## 2025-07-20 PROCEDURE — 6360000002 HC RX W HCPCS: Performed by: INTERNAL MEDICINE

## 2025-07-20 PROCEDURE — 6370000000 HC RX 637 (ALT 250 FOR IP): Performed by: NURSE PRACTITIONER

## 2025-07-20 RX ADMIN — ASPIRIN 81 MG: 81 TABLET, COATED ORAL at 07:49

## 2025-07-20 RX ADMIN — DULOXETINE HYDROCHLORIDE 30 MG: 30 CAPSULE, DELAYED RELEASE ORAL at 07:49

## 2025-07-20 RX ADMIN — PIPERACILLIN AND TAZOBACTAM 3375 MG: 3; .375 INJECTION, POWDER, FOR SOLUTION INTRAVENOUS at 07:54

## 2025-07-20 RX ADMIN — ATORVASTATIN CALCIUM 80 MG: 40 TABLET, FILM COATED ORAL at 07:49

## 2025-07-20 RX ADMIN — PIPERACILLIN AND TAZOBACTAM 3375 MG: 3; .375 INJECTION, POWDER, FOR SOLUTION INTRAVENOUS at 00:25

## 2025-07-20 RX ADMIN — Medication 400 MG: at 07:49

## 2025-07-20 NOTE — PROGRESS NOTES
Discharge instructions given to Liss Puckett. Patient verbalized understanding of all discharge instructions; written and verbal. Adequate time given for all questions, all questions asked/answered. Discharge medication reviewed as appropriate and all needed prescriptions discussed. Patient discharged home via Medical transportation.

## 2025-07-20 NOTE — DISCHARGE SUMMARY
Hospitalist Discharge Summary   Admit Date:  7/15/2025  9:59 PM   DC Note date: 2025  Name:  Liss Puckett   Age:  74 y.o.  Sex:  female  :  1950   MRN:  [id number]   Room:  [facility]  PCP:  Unknown, Provider    Presenting Complaint: No chief complaint on file.     Initial Admission Diagnosis: Sepsis (HCC) [A41.9]     Problem List for this Hospitalization (present on admission):    Active Problems:    Essential hypertension    Anxiety and depression    Type 2 diabetes mellitus without complication, without long-term current use of insulin (HCC)    Sepsis (HCC)    Colitis    Hypokalemia    Hypomagnesemia    Cystitis    CVA (cerebral vascular accident) (HCC)    Right sided weakness    Urine retention    Irritability  Resolved Problems:    * No resolved hospital problems. *      Hospital Course:  Liss Puckett is a 74 y.o. female with medical history of hypertension, hyperlipidemia, type 2 diabetes, history of recurrent UTIs, prior CVA with right-sided weakness who presented with generalized weakness and malaise. Due to prior stroke, patient has dysarthria and therefore difficult to comprehend her speech.  Additional information was obtained from patient's aide, Lauryn Iyer.  Ms. Iyer states that patient complained about dysuria as well as CVA pain.   Due to her symptoms, Ms. 0.1 and brought Azo strips which was positive.  Patient was given the cranberry pills.  Patient continued to complain of malaise as well as dysuria and pain upon wiping.  She reports that patient mainly wheelchair-bound and was sent on a wheelchair 2 to 3 hours daily but mainly in bed most of the time.  Patient is able to move her feet while she is in bed but has right-sided weakness from prior stroke.     In the ED, patient was tachycardic and tachypneic with blood pressure of 78/61 initially and responded to fluids.  Laboratory workup notable for WBC of 24.3, serum bicarb of 18, anion gap of 17, magnesium 1.7, lactic acid of 2.9, 
confusion on my examination, has right extremity flaccidity and flexion contracture noted right upper extremity   psych:  Normal mood and affect.  Calm, accepting of care.  Excited to be discharging home today    Signed:  DANIELLA Obando - CNP    Part of this note may have been written by using a voice dictation software.  The note has been proof read but may still contain some grammatical/other typographical errors.

## 2025-07-20 NOTE — PLAN OF CARE
Problem: Chronic Conditions and Co-morbidities  Goal: Patient's chronic conditions and co-morbidity symptoms are monitored and maintained or improved  7/20/2025 1008 by Ness Suero RN  Outcome: Progressing  7/20/2025 0135 by Humble Fischer RN  Outcome: Progressing     Problem: Discharge Planning  Goal: Discharge to home or other facility with appropriate resources  7/20/2025 1008 by Ness Suero RN  Outcome: Progressing  7/20/2025 0135 by Humble Fischer RN  Outcome: Progressing     Problem: Skin/Tissue Integrity  Goal: Skin integrity remains intact  Description: 1.  Monitor for areas of redness and/or skin breakdown  2.  Assess vascular access sites hourly  3.  Every 4-6 hours minimum:  Change oxygen saturation probe site  4.  Every 4-6 hours:  If on nasal continuous positive airway pressure, respiratory therapy assess nares and determine need for appliance change or resting period  7/20/2025 1008 by Ness Suero RN  Outcome: Progressing  7/20/2025 0135 by Humble Fischer RN  Outcome: Progressing     Problem: Safety - Adult  Goal: Free from fall injury  7/20/2025 1008 by Ness Suero RN  Outcome: Progressing  7/20/2025 0135 by Humble Fischer RN  Outcome: Progressing     Problem: Pain  Goal: Verbalizes/displays adequate comfort level or baseline comfort level  7/20/2025 1008 by Ness Suero RN  Outcome: Progressing  7/20/2025 0135 by Humble Fischer RN  Outcome: Progressing

## 2025-07-20 NOTE — CARE COORDINATION
RNCM spoke with patient at the bedside. RNCM inquired about scheduling of CNA, as the patient is seen in the mornings while the son would be at home and is left unattended in the afternoons. The patient presented no explanation for the scheduling and only stated, \"Don't piss me off. I have been trying for three days to go home.\" RNCM explained to patient that transportation was not an option yesterday afternoon, as there was no one at the home to receive the patient. RNCM informed patient that transportation will be arranged today, as long as the MD is agreeable to medical stability for discharge today.     0950: RNCM spoke with son of patient regarding transportation at 12:00pm today. Son is agreeable to this discharge plan. Son states that the patient does not want the CNA hours changed. He states that she seems content with lying in bed during the hours that he is at work and wants to be left alone during that time. Medical transport to be scheduled by RNCM for 12:00 today. Patient referral placed with [facility], [facility], and [facility]. Son is aware of these referrals and is agreeable to services being offered in the home.     Case management will follow closely until discharge. Please notify if there are any changes.